# Patient Record
Sex: FEMALE | Race: WHITE
[De-identification: names, ages, dates, MRNs, and addresses within clinical notes are randomized per-mention and may not be internally consistent; named-entity substitution may affect disease eponyms.]

---

## 2017-10-31 ENCOUNTER — HOSPITAL ENCOUNTER (INPATIENT)
Dept: HOSPITAL 92 - SURG A | Age: 82
LOS: 3 days | Discharge: NURSING, REHAB OR LONG-TERM CARE | DRG: 460 | End: 2017-11-03
Attending: NEUROLOGICAL SURGERY | Admitting: NEUROLOGICAL SURGERY
Payer: MEDICARE

## 2017-10-31 VITALS — BODY MASS INDEX: 20.3 KG/M2

## 2017-10-31 DIAGNOSIS — M43.16: Primary | ICD-10-CM

## 2017-10-31 DIAGNOSIS — Z88.8: ICD-10-CM

## 2017-10-31 DIAGNOSIS — M54.16: ICD-10-CM

## 2017-10-31 DIAGNOSIS — G96.0: ICD-10-CM

## 2017-10-31 DIAGNOSIS — M51.27: ICD-10-CM

## 2017-10-31 DIAGNOSIS — M48.061: ICD-10-CM

## 2017-10-31 DIAGNOSIS — R32: ICD-10-CM

## 2017-10-31 DIAGNOSIS — M16.0: ICD-10-CM

## 2017-10-31 PROCEDURE — 01NB0ZZ RELEASE LUMBAR NERVE, OPEN APPROACH: ICD-10-PCS | Performed by: NEUROLOGICAL SURGERY

## 2017-10-31 PROCEDURE — 76001: CPT

## 2017-10-31 PROCEDURE — 00QT0ZZ REPAIR SPINAL MENINGES, OPEN APPROACH: ICD-10-PCS | Performed by: NEUROLOGICAL SURGERY

## 2017-10-31 PROCEDURE — A4216 STERILE WATER/SALINE, 10 ML: HCPCS

## 2017-10-31 PROCEDURE — 0SG1071 FUSION OF 2 OR MORE LUMBAR VERTEBRAL JOINTS WITH AUTOLOGOUS TISSUE SUBSTITUTE, POSTERIOR APPROACH, POSTERIOR COLUMN, OPEN APPROACH: ICD-10-PCS | Performed by: NEUROLOGICAL SURGERY

## 2017-10-31 RX ADMIN — Medication SCH GM: at 18:08

## 2017-10-31 NOTE — XMS
Clinical Summary

 Created on:2017



Patient:Leighann Hong

Sex:Female

:1929

External Reference #:[id number]





Demographics







 Address  [address] [place] [id number]

 

 Home Phone  [phone]

 

 Preferred Language  Unknown

 

 Marital Status  

 

 Voodoo Affiliation  Unknown

 

 Race  White

 

 Ethnic Group  Not  or 









Author







 Organization  [facility]

 

 Address  [address] [place] [address]

 

 Phone  [phone]









Care Team Providers







 Name  Role  Phone

 

 ,  Primary Care Provider  Unavailable









Allergies

Not on File



Current Medications

Not on file



Active Problems

Not on file



Social History







 Tobacco Use  Types  Packs/Day  Years Used  Date

 

 Never Assessed        









 Sex Assigned at Birth  Date Recorded

 

 Not on file  







Last Filed Vital Signs

Not on file



Plan of Treatment

Not on file



Results

Not on filefrom Last 3 Months

## 2017-11-01 RX ADMIN — Medication SCH GM: at 02:49

## 2017-11-01 RX ADMIN — ACETAMINOPHEN AND CODEINE PHOSPHATE PRN TAB: 300; 30 TABLET ORAL at 16:39

## 2017-11-01 RX ADMIN — THERA TABS SCH TAB: TAB at 08:03

## 2017-11-01 RX ADMIN — ACETAMINOPHEN AND CODEINE PHOSPHATE PRN TAB: 300; 30 TABLET ORAL at 13:07

## 2017-11-01 NOTE — PRG
DATE OF SERVICE:  11/01/2017

 

Ms. Hong is postoperative day 1 from L3-L5 decompressive laminectomy with L3-L4 in situ fusion f
or spondylolisthesis.  She is doing well this morning.  She does have, as expected, incisional pain 
and various aspects of neuropathic pain in her lower extremities.  She had significant stenosis and 
we did have an intraoperative CSF leak that was repaired.  She has no symptoms of CSF hypotension to
day and moves extremities well.  We will plan to mobilize her.  She has longstanding urinary inconti
nence even preoperatively she tells me and she is concerned in that she does not want to have the Fo
rosario catheter removed at this time.  We will consult Urology in regards to both short and long-term p
lans that they deem best.  I anticipate patient will be going to rehab at some point.

## 2017-11-01 NOTE — OP
DATE OF PROCEDURE:  10/31/2017  

 

OR:  OR #11.

 

WOUND TYPE:  Type 1 wound.

 

SURGEON:  Juarez Hart M.D.

 

ASSISTANT:  Gene Rueda PA-C.

 

PREPROCEDURE DIAGNOSES:  L3-L4 spondylolisthesis with L3-L5 severe stenosis with low back and leg pa
in.

 

POSTPROCEDURE DIAGNOSES:  L3-L4 spondylolisthesis with L3-L5 severe stenosis with low back and leg p
ain.

 

PROCEDURE:

1.  L3-L4, L4-L5 laminectomies, partial facetectomies, foraminotomies L3, L4, L5 nerve roots.

2.  L3-L4 in situ posterolateral fusion with local bone autograft obtained from same incision and al
lograft.

 

DESCRIPTION OF PROCEDURE:  After informed consent was obtained from the patient, the patient brought
 to OR 11.  Proper patient pause and identification was carried out.  She was placed under excellent
 general endotracheal anesthesia and positioned prone on the operating table.  All appropriate point
s were padded.  We identified the L3, L4, L5 dorsal spines and this area was sterilely cleansed, pre
pared, and draped.  Proper patient pause and identification was carried out.  A rian had been made o
hue the L3-L5 segments.  Again, this was sterilely cleansed, prepared, and draped.  After proper pat
ient pause and identification, the wound was then opened with a combination of sharp, monopolar and 
blunt dissection, the L3, L4, L5 dorsal spines and lamina were exposed.  There was significant spond
ylosis as expected and we then with localization film performed an L3, L4, L5 laminectomy over the L
3-L4 segment.  The dura was quite attenuated given the age of the patient and the longstanding compr
ession.  We came upon a CSF leak.  This was repaired with no further leak.  Copious irrigation occur
red throughout.  We had excellent decompression of the L3, L4, and L5 nerve roots and restoration of
 CSF flow as the patient had severe stenosis again in particular at the L3-L4 segment, likely no mayra
w or very little CSF flow through that segment.  We were pleased with our decompression.  Hemostasis
 was maximized throughout.  The wound was then closed in anatomic layers following the sprinkling of
 vancomycin powder and a small amount of dural sealant over the dural tube.

## 2017-11-02 RX ADMIN — THERA TABS SCH TAB: TAB at 09:10

## 2017-11-02 RX ADMIN — ACETAMINOPHEN AND CODEINE PHOSPHATE PRN TAB: 300; 30 TABLET ORAL at 05:49

## 2017-11-02 RX ADMIN — ACETAMINOPHEN AND CODEINE PHOSPHATE PRN TAB: 300; 30 TABLET ORAL at 21:46

## 2017-11-02 RX ADMIN — ACETAMINOPHEN AND CODEINE PHOSPHATE PRN TAB: 300; 30 TABLET ORAL at 11:49

## 2017-11-02 NOTE — PRG
DATE OF SERVICE:  11/02/2017

 

SUBJECTIVE:  Ms. Hong is postoperative day 2 from a multilevel lumbar decompression and in situ 
fusion.  Ms. Hong is doing very well this morning.  She has had significant improvement in her p
ain in her low back and legs and will begin to mobilize more with therapy today.  I think she would 
benefit from inpatient rehabilitation.

 

PHYSICAL EXAMINATION:  Wound is dry as is her dressing.  She is concerned about her longstanding 10-
year urinary incontinence and the spread of urine into her wound, but I think it is prudent to remov
e her Rodriguez catheter.  Our Urology colleagues stated that they would see her as an outpatient.  As s
[facility], we will make arrangements to remove her Rodriguez catheter and arrange for diapers for her to wear 
and attempt to do dressing that prevents soilage of the wound.  I would be fine with transfer to irvin
ab at any point beginning today.

## 2017-11-03 VITALS — SYSTOLIC BLOOD PRESSURE: 159 MMHG | DIASTOLIC BLOOD PRESSURE: 78 MMHG

## 2017-11-03 VITALS — TEMPERATURE: 98 F

## 2017-11-03 RX ADMIN — THERA TABS SCH TAB: TAB at 08:45

## 2017-11-03 RX ADMIN — ACETAMINOPHEN AND CODEINE PHOSPHATE PRN TAB: 300; 30 TABLET ORAL at 09:58

## 2017-11-03 NOTE — PRG
DATE OF SERVICE:  11/03/2017

 

Ms. Hong is now hospital day 3 for multilevel lumbar decompression and insitu fusion. She is doi
ng well with some low back and leg pain, but perhaps she may have overdone it a bit.  Yesterday, she
 has been approved for rehabilitation.  Her wound is dry with scant drainage on her dressing.  She h
as had no symptoms of CSF hypotension and has good strength in her lower extremities.  I suspect she
 simply needs time.  We will arrange for her to be transferred to inpatient rehabilitation.  She has
 longstanding urinary incontinence and we have arranged for dressings to protect her lumbar wound.  
We will arrange appropriate followup.

## 2017-11-06 NOTE — DIS
DATE OF ADMISSION:  10/31/2017

 

DATE OF DISCHARGE:  11/03/2017

 

DISCHARGE DIAGNOSES:  

1.  Low back pain with bilateral lower extremity pain.  

2.  Lumbar spondylolisthesis at L3-4.  

3.  Lumbar spinal stenosis.  

4.  Chronic urinary incontinence. 

 

HOSPITAL COURSE:  Ms. Hong was admitted on 10/31/2017 to undergo L3-L5 laminectomies and L3-4 in
 situ posterior lateral fusion with Dr. Hart.  The patient tolerated the procedure well; however, 
due to the severity of the patient's spinal stenosis and longstanding compression, we did come upon 
a CSF leak that was repaired with Surgicel and no evidence of CSF leak postoperatively.  The patient
 was sent to recover on the surgical floor for 2 overnight stays.  Rodriguez catheter was placed for the
 patient and Urology was consulted; however, due to the chronicity of the patient's urinary incontin
ence, they opted to follow up with the patient on an outpatient basis.  The patient had no postural 
headache postoperatively.  She did experience some radicular symptoms postoperatively, likely relate
d to radiculitis and was placed on a Medrol Dosepak postoperatively which significantly helped her s
ymptoms.  She did work with Physical Therapy and Occupational Therapy, was later discharged on the m
orning of 11/03/2017 to inpatient rehab.  The patient's son was updated in great detail on the morni
ng of Friday, November 3 as well.  At the time of discharge the patient was improving in regard to h
er back symptoms, but did have some incisional back pain, but was pleased with her progress postoper
atively.  Again, outpatient Urology appointment was arranged and the patient was stable at discharge
.  Appropriate patient education and outpatient follow up appointments were scheduled, with the unde
rstanding that the patient may call the office at any time with questions or concerns regarding her 
care.  Again, at the time of discharge the patient and her son were very pleased with her outcome po
stoperatively with the understanding that she does need continued time to heal.

## 2017-12-11 ENCOUNTER — HOSPITAL ENCOUNTER (OUTPATIENT)
Dept: HOSPITAL 92 - BICMRI | Age: 82
Discharge: HOME | End: 2017-12-11
Attending: FAMILY MEDICINE
Payer: MEDICARE

## 2017-12-11 DIAGNOSIS — M99.73: ICD-10-CM

## 2017-12-11 DIAGNOSIS — M43.16: Primary | ICD-10-CM

## 2017-12-11 DIAGNOSIS — M48.061: ICD-10-CM

## 2017-12-11 DIAGNOSIS — M25.78: ICD-10-CM

## 2017-12-11 DIAGNOSIS — Z98.890: ICD-10-CM

## 2017-12-11 PROCEDURE — 72148 MRI LUMBAR SPINE W/O DYE: CPT

## 2018-01-05 ENCOUNTER — HOSPITAL ENCOUNTER (INPATIENT)
Dept: HOSPITAL 92 - ERS | Age: 83
LOS: 4 days | Discharge: NURSING, REHAB OR LONG-TERM CARE | DRG: 274 | End: 2018-01-09
Attending: FAMILY MEDICINE | Admitting: FAMILY MEDICINE
Payer: MEDICARE

## 2018-01-05 VITALS — BODY MASS INDEX: 20.6 KG/M2

## 2018-01-05 DIAGNOSIS — M48.00: ICD-10-CM

## 2018-01-05 DIAGNOSIS — B37.49: ICD-10-CM

## 2018-01-05 DIAGNOSIS — R32: ICD-10-CM

## 2018-01-05 DIAGNOSIS — E87.1: ICD-10-CM

## 2018-01-05 DIAGNOSIS — I10: ICD-10-CM

## 2018-01-05 DIAGNOSIS — I95.9: ICD-10-CM

## 2018-01-05 DIAGNOSIS — Z88.8: ICD-10-CM

## 2018-01-05 DIAGNOSIS — R00.0: ICD-10-CM

## 2018-01-05 DIAGNOSIS — B96.89: ICD-10-CM

## 2018-01-05 DIAGNOSIS — Z88.5: ICD-10-CM

## 2018-01-05 DIAGNOSIS — R00.1: ICD-10-CM

## 2018-01-05 DIAGNOSIS — R55: Primary | ICD-10-CM

## 2018-01-05 DIAGNOSIS — I44.7: ICD-10-CM

## 2018-01-05 DIAGNOSIS — D64.9: ICD-10-CM

## 2018-01-05 DIAGNOSIS — F03.90: ICD-10-CM

## 2018-01-05 DIAGNOSIS — E53.8: ICD-10-CM

## 2018-01-05 DIAGNOSIS — N39.0: ICD-10-CM

## 2018-01-05 LAB
ALBUMIN SERPL BCG-MCNC: 3.4 G/DL (ref 3.4–4.8)
ALP SERPL-CCNC: 48 U/L (ref 40–150)
ALT SERPL W P-5'-P-CCNC: 11 U/L (ref 8–55)
ANION GAP SERPL CALC-SCNC: 13 MMOL/L (ref 10–20)
AST SERPL-CCNC: 15 U/L (ref 5–34)
BACTERIA UR QL AUTO: (no result) HPF
BASOPHILS # BLD AUTO: 0 THOU/UL (ref 0–0.2)
BASOPHILS NFR BLD AUTO: 0.2 % (ref 0–1)
BILIRUB SERPL-MCNC: 0.5 MG/DL (ref 0.2–1.2)
BUN SERPL-MCNC: 11 MG/DL (ref 9.8–20.1)
CALCIUM SERPL-MCNC: 9.2 MG/DL (ref 7.8–10.44)
CHLORIDE SERPL-SCNC: 101 MMOL/L (ref 98–107)
CK MB SERPL-MCNC: 1.4 NG/ML (ref 0–6.6)
CK SERPL-CCNC: 34 U/L (ref 29–168)
CO2 SERPL-SCNC: 22 MMOL/L (ref 23–31)
CREAT CL PREDICTED SERPL C-G-VRATE: 0 ML/MIN (ref 70–130)
CRYSTAL-AUWI FLAG: 0 (ref 0–15)
EOSINOPHIL # BLD AUTO: 0.2 THOU/UL (ref 0–0.7)
EOSINOPHIL NFR BLD AUTO: 1.8 % (ref 0–10)
GLOBULIN SER CALC-MCNC: 2.7 G/DL (ref 2.4–3.5)
GLUCOSE SERPL-MCNC: 115 MG/DL (ref 83–110)
HEV IGM SER QL: 0 (ref 0–7.99)
HGB BLD-MCNC: 10.2 G/DL (ref 12–16)
HYALINE CASTS #/AREA URNS LPF: (no result) LPF
LYMPHOCYTES # BLD: 1 THOU/UL (ref 1.2–3.4)
LYMPHOCYTES NFR BLD AUTO: 11.2 % (ref 21–51)
MAGNESIUM SERPL-MCNC: 1.9 MG/DL (ref 1.6–2.6)
MCH RBC QN AUTO: 35 PG (ref 27–31)
MCV RBC AUTO: 102 FL (ref 81–99)
MONOCYTES # BLD AUTO: 0.8 THOU/UL (ref 0.11–0.59)
MONOCYTES NFR BLD AUTO: 8.9 % (ref 0–10)
NEUTROPHILS # BLD AUTO: 7.2 THOU/UL (ref 1.4–6.5)
NEUTROPHILS NFR BLD AUTO: 77.9 % (ref 42–75)
PATHC CAST-AUWI FLAG: 0 (ref 0–2.49)
PLATELET # BLD AUTO: 324 THOU/UL (ref 130–400)
POTASSIUM SERPL-SCNC: 3.6 MMOL/L (ref 3.5–5.1)
PROT UR STRIP.AUTO-MCNC: 100 MG/DL
RBC # BLD AUTO: 2.91 MILL/UL (ref 4.2–5.4)
RBC UR QL AUTO: (no result) HPF (ref 0–3)
SODIUM SERPL-SCNC: 132 MMOL/L (ref 136–145)
SP GR UR STRIP: 1.02 (ref 1–1.04)
SPERM-AUWI FLAG: 0 (ref 0–9.9)
TROPONIN I SERPL DL<=0.01 NG/ML-MCNC: (no result) NG/ML (ref ?–0.03)
WBC # BLD AUTO: 9.2 THOU/UL (ref 4.8–10.8)
YEAST FLD HPF-#/AREA: (no result) HPF
YEAST-AUWI FLAG: 1570 (ref 0–25)

## 2018-01-05 PROCEDURE — 33282: CPT

## 2018-01-05 PROCEDURE — 93306 TTE W/DOPPLER COMPLETE: CPT

## 2018-01-05 PROCEDURE — 81003 URINALYSIS AUTO W/O SCOPE: CPT

## 2018-01-05 PROCEDURE — 83735 ASSAY OF MAGNESIUM: CPT

## 2018-01-05 PROCEDURE — 84443 ASSAY THYROID STIM HORMONE: CPT

## 2018-01-05 PROCEDURE — 93010 ELECTROCARDIOGRAM REPORT: CPT

## 2018-01-05 PROCEDURE — 85025 COMPLETE CBC W/AUTO DIFF WBC: CPT

## 2018-01-05 PROCEDURE — 93880 EXTRACRANIAL BILAT STUDY: CPT

## 2018-01-05 PROCEDURE — 87086 URINE CULTURE/COLONY COUNT: CPT

## 2018-01-05 PROCEDURE — 80053 COMPREHEN METABOLIC PANEL: CPT

## 2018-01-05 PROCEDURE — C1730 CATH, EP, 19 OR FEW ELECT: HCPCS

## 2018-01-05 PROCEDURE — 81015 MICROSCOPIC EXAM OF URINE: CPT

## 2018-01-05 PROCEDURE — 82550 ASSAY OF CK (CPK): CPT

## 2018-01-05 PROCEDURE — C1769 GUIDE WIRE: HCPCS

## 2018-01-05 PROCEDURE — 93621 COMP EP EVL L PAC&REC C SINS: CPT

## 2018-01-05 PROCEDURE — 71045 X-RAY EXAM CHEST 1 VIEW: CPT

## 2018-01-05 PROCEDURE — 36415 COLL VENOUS BLD VENIPUNCTURE: CPT

## 2018-01-05 PROCEDURE — 84484 ASSAY OF TROPONIN QUANT: CPT

## 2018-01-05 PROCEDURE — 80048 BASIC METABOLIC PNL TOTAL CA: CPT

## 2018-01-05 PROCEDURE — 93005 ELECTROCARDIOGRAM TRACING: CPT

## 2018-01-05 PROCEDURE — 82553 CREATINE MB FRACTION: CPT

## 2018-01-05 PROCEDURE — C1764 EVENT RECORDER, CARDIAC: HCPCS

## 2018-01-05 RX ADMIN — SULFAMETHOXAZOLE AND TRIMETHOPRIM SCH TAB: 800; 160 TABLET ORAL at 21:13

## 2018-01-05 NOTE — RAD
PORTABLE CHEST ONE VIEW:

 

Date: 1-5-18 

Time: 9:02 a.m.

 

History: Syncope. 

 

FINDINGS: 

The heart is enlarged. The aorta is tortuous. The lungs are well expanded without focal areas of cons
olidation, pneumothorax, rafael pulmonary edema, or pleural effusions. 

 

IMPRESSION: 

Cardiomegaly. 

 

POS: OFF

## 2018-01-05 NOTE — HP-2
CODE STATUS:  FULL.

 

PRIMARY CARE PHYSICIAN:  Ayla Wood M.D.

 

ATTENDING:   Ayla Wood M.D.

 

RESIDENT:  Harish Clements DO

 

HISTORIAN:  The patient.

 

CHIEF COMPLAINT:  Syncope.

 

HISTORY OF PRESENT ILLNESS:  This morning in nursing home, the patient became "very tired" and then h
ad a syncopal episode which was witnessed by nursing facility staff.  Immediately thereafter, the pat
ient was noted to be significantly tachycardic and hypotensive and EMS was called when at the time of
 EMS arrival, the patient's heart rate was in the 40s.  She was hypertensive.  Upon arrival at the em
ergency room, her heart rate varied from the 50s to the 110s and blood pressure was stable throughout
.  She has never had similar episodes in the past.  She has no prior heart history.  In the emergency
 room, she received 1.5 liters of fluid.

 

PAST MEDICAL HISTORY:  Includes spinal stenosis, urinary incontinence, chronic B12 deficient anemia, 
and dementia.

 

PAST SURGICAL HISTORY:  Includes L3 through L5 laminectomy, hysterectomy, and a bladder lift.

 

ALLERGIES:  GABAPENTIN, NORCO, and PROCAINE.

 

MEDICATIONS:  Amlodipine 5 mg b.i.d., tramadol 50 mg q.6 hours p.r.n. pain, vitamin cholecalciferol 1
000 units daily, donepezil 10 mg daily, MiraLax 17 grams daily, multivitamin daily, Premarin 0.625 mg
 every other day, oxybutynin chloride 2.5 mg t.i.d., Lyrica 75 mg daily, tizanidine 4 mg q.8 hours p.
r.n.

 

SOCIAL HISTORY:  Tobacco, none.  ETOH, none.  Drugs, none.

 

REVIEW OF SYSTEMS:  General:  Denies fever, chills, change in appetite or sleep.  HEENT:  Denies any 
vision changes or _____ changes.  Denies any nasal congestion.  Respiratory:  Denies cough, congestio
n, or shortness of breath.  Cardiovascular:  Denies chest pain.  Admits to palpitations.  GI:  Denies
 any nausea or vomiting.  Genitourinary:  Admits to chronic incontinence without dysuria.  Skin:  Den
ies rashes or lesions.  Musculoskeletal:  Denies any pain or tenderness.  Neurologic:  Admits to weak
ness and syncope.  Denies numbness or seizure.  Psychiatric:  Denies anxiety or depression.

 

PHYSICAL EXAMINATION:

VITAL SIGNS:  Blood pressure 125/67.  Pulse 69, this varies between .  Respiratory rate 19, T-m
ax 97.5, pulse ox 99% on room air, current weight 51.7 kg.

GENERAL:  The patient is alert and oriented x3, in no apparent distress.

HEENT:  PERRLA, EOMI.  Conjunctivae within normal limits.

NECK:  Supple.  No lymphadenopathy.

CARDIOVASCULAR:  Regular rate and rhythm without murmur.

RESPIRATORY:  Normal effort.  Clear to auscultation bilaterally without retractions.

SKIN:  Warm and dry.

ABDOMEN:  Soft, nontender.  Bowel sounds in all 4 quadrants without masses or distention.

EXTREMITIES:  No clubbing or cyanosis.

MUSCULOSKELETAL:  Tone is within normal limits.

NEUROLOGICAL:  No focal neurological deficit.  Cranial nerves II through XII grossly intact.

 

LABORATORY DATA:  CBC:  Hemoglobin 10.2, hematocrit 29.8, white count 9.2, platelets 324, , ne
utrophils 77.9%.  CMP:  Sodium 132, potassium 3.6, chloride 101, bicarbonate 22, BUN 11, creatinine 0
.76, glucose is 115, calcium is 9.2, total serum protein 6.1, albumin is 3.1, total bilirubin 0.5, AS
T 15, ALT 11, alkaline phosphatase 48, GFR 72.  CK is 34, CK-MB is 1.4.  Troponins are negative.  TSH
 is 3.1.  UA; specific gravity 1.016, blood moderate, protein 100, leukocyte esterase large, nitrites
 negative, ketones negative, glucose negative, rbc's 21-50, wbc's too numerous to count, bacteria 4+,
 yeast 1+.  EKG:  Normal sinus rhythm with a left bundle branch block.  Chest x-ray shows cardiomegal
y.

 

ASSESSMENT AND PLAN:

1.  This is an 88-year-old female with recent history of syncope and bradycardia with tachycardia.

2.  Syncope.  Admit to tele obs.  Monitor for any arrhythmia.  Consult Cardiology for possible sick s
inus syndrome.

3.  Urinary tract infection, both Candida and bacterial.  Urine cultures.  Start Diflucan and Bactrim
.

4.  Status post laminectomy, L3 through L5.  The patient is currently in skilled nursing facility for
 PT and rehabilitation.  We will consult PT here.

5.  Chronic macrocytic anemia.  Continue B12 and folate.

6.  Chronic incontinence.  Continue her home medications.

7.  Hyponatremia, currently asymptomatic.  We will continue IV fluids and recheck BMP in the morning.


8.  Deep venous thrombosis prophylaxis.  We will apply SCDs.  Symptomatic medications will be provide
d.

 

History, physical exam, as well as management were discussed with Dr. Wood.

## 2018-01-06 LAB
ANION GAP SERPL CALC-SCNC: 10 MMOL/L (ref 10–20)
BUN SERPL-MCNC: 11 MG/DL (ref 9.8–20.1)
CALCIUM SERPL-MCNC: 8.9 MG/DL (ref 7.8–10.44)
CHLORIDE SERPL-SCNC: 104 MMOL/L (ref 98–107)
CO2 SERPL-SCNC: 23 MMOL/L (ref 23–31)
CREAT CL PREDICTED SERPL C-G-VRATE: 51 ML/MIN (ref 70–130)
GLUCOSE SERPL-MCNC: 93 MG/DL (ref 83–110)
POTASSIUM SERPL-SCNC: 4.1 MMOL/L (ref 3.5–5.1)
SODIUM SERPL-SCNC: 133 MMOL/L (ref 136–145)

## 2018-01-06 RX ADMIN — SULFAMETHOXAZOLE AND TRIMETHOPRIM SCH TAB: 800; 160 TABLET ORAL at 08:39

## 2018-01-06 RX ADMIN — SULFAMETHOXAZOLE AND TRIMETHOPRIM SCH TAB: 800; 160 TABLET ORAL at 20:55

## 2018-01-06 RX ADMIN — MULTIPLE VITAMINS W/ MINERALS TAB SCH TAB: TAB at 08:39

## 2018-01-06 NOTE — ULT
CAROTID ULTRASOUND WITH GRAY SCALE AND DOPPLER DUPLEX COLOR FLOW IMAGING

SPECTRAL ANALYSIS PERFORMED:

 

CLINICAL INDICATION: 

Syncope.

 

FINDINGS: 

There is mild plaque/intimal thickening and atherosclerotic calcification of the carotid arteries.

 

PEAK SYSTOLIC VELOCITY (CM/S):

Right CCA         89

Left CCA         102

Right ICA         69

Left ICA          83

 

There is antegrade flow within the visualized bilateral vertebral arteries.

 

IMPRESSION: 

1.  No hemodynamically significant stenosis of the right internal carotid artery.

2.  No hemodynamically significant stenosis of the left internal carotid artery.

 

POS: PAPO

## 2018-01-06 NOTE — PDOC.FM
- Subjective


Subjective: 





Mrs. Hong is doing well this morning.  States that she is asymptomatic just 

as she has been since the syncopal episode occurred yesterday.  She states that 

she would like to go home today. Denies chest pain, dyspnea, n/v/d.





- Objective


MAR Reviewed: Yes


Vital Signs & Weight: 


 Vital Signs (12 hours)











  Temp Pulse Resp BP Pulse Ox


 


 01/06/18 04:20  97.5 F L  76  18  154/67 H  97


 


 01/06/18 00:21  98.5 F  78  16  133/64  97


 


 01/05/18 21:13  98.2 F  96  18  


 


 01/05/18 19:33  98.2 F  96  18  173/75 H  95


 


 01/05/18 18:11  98.1 F  86  18  179/80 H  99








 Weight











Weight                         52.844 kg














I&O: 


 











 01/04/18 01/05/18 01/06/18





 06:59 06:59 06:59


 


Intake Total   720


 


Balance   720











Result Diagrams: 


 01/05/18 08:54





 01/06/18 04:58





<Yovani Martinez - Last Filed: 01/06/18 10:56>





- Objective


Vital Signs & Weight: 


 Vital Signs (12 hours)











  Temp Pulse Resp BP BP BP BP


 


 01/06/18 15:39  97.5 F L  92  16   138/79  


 


 01/06/18 11:38  97.8 F  78  18  180/74 H   


 


 01/06/18 08:30  97.5 F L  76  18    


 


 01/06/18 08:22  98.1 F  77  22 H   175/91 H  185/79 H  175/77 H


 


 01/06/18 04:20  97.5 F L  76  18  154/67 H   














  Pulse Ox


 


 01/06/18 15:39  97


 


 01/06/18 11:38  99


 


 01/06/18 08:30 


 


 01/06/18 08:22  98


 


 01/06/18 04:20  97








 Weight











Weight                         52.844 kg














I&O: 


 











 01/05/18 01/06/18 01/07/18





 06:59 06:59 06:59


 


Intake Total  720 


 


Balance  720 











Result Diagrams: 


 01/05/18 08:54





 01/06/18 04:58





<Ayla Wood - Last Filed: 01/06/18 16:13>





Phys Exam





- Physical Examination


Constitutional: NAD


HEENT: moist MMs, sclera anicteric


Neck: no JVD, supple, full ROM


Respiratory: no wheezing, no rales, no rhonchi, clear to auscultation bilateral


Cardiovascular: RRR, no significant murmur, no rub


Gastrointestinal: soft, non-tender, no distention


Musculoskeletal: pulses present, edema present


1+ LE pitting edema b/l


Neurological: non-focal, normal sensation, moves all 4 limbs


Psychiatric: normal affect, A&O x 3





<Yovani Martinez - Last Filed: 01/06/18 10:56>





Dx/Plan


(1) Symptomatic bradycardia


Code(s): R00.1 - BRADYCARDIA, UNSPECIFIED   Status: Acute   


Plan: 


Considering Sick Sinus Syndrome


-Dr. Wiseman with Cardiology has been consulted, appreciate recs.


-continue monitoring vitals and rhythm








(2) UTI (urinary tract infection)


Status: Acute   


Plan: 


Start Bactrim and one time PO diflucan








(3) History of laminectomy


Code(s): Z98.890 - OTHER SPECIFIED POSTPROCEDURAL STATES   Status: Chronic   


Plan: 


pt currently in SNF for PT and rehab, will continue PT here








(4) Macrocytic anemia


Code(s): D53.9 - NUTRITIONAL ANEMIA, UNSPECIFIED   Status: Chronic   


Plan: 


Continue home meds 








(5) Hyponatremia


Code(s): E87.1 - HYPO-OSMOLALITY AND HYPONATREMIA   Status: Acute   


Plan: 


Currently asymptomatic, 132 on admission


Will continue to monitor, tolerating normal diet and PO fluid intake








<Yovani Martinez - Last Filed: 01/06/18 10:56>





Attending Addendum





- Attending Addendum


I personally evaluated the patient and discussed the management with Dr. Martinez.


I agree with the History, Examination, Assessment and Plan documented above 

with any addition or exceptions noted below.


The patient has remained stable.  The patient's heart rate is stable.  Getting 

carotid doppler.  Cardiology has been consulted.  Appreciate their 

recommendations.





<Ayla Wood - Last Filed: 01/06/18 16:13>

## 2018-01-06 NOTE — HP
DATE OF SERVICE:  01/05/2018

 

CHIEF COMPLIANT:  Syncope and hypertension.

 

HISTORY OF PRESENT ILLNESS:  The patient is an 88-year-old  female 
with a past medical history of spinal stenosis, status post back surgery, 
chronic B12 deficiency anemia who is well known to me and was sent to the ER 
from [facility].  The patient was difficult to arouse 
at the dining room while she was eating breakfast.  The patient's vitals were 
taken and her systolic blood pressure was in the 80s and was initially 
tachycardic.  Patient was able to wake up for a minute and said "I woke up at 5:
00 a.m." and then passed out again.  EMS was called.  When the EMS arrived, the 
patient was noted to be bradycardic at that time, and was brought to the ER.  
In the ER, she received a liter and a half of fluid though she was hypertensive 
and bradycardic.  All troponins have been negative.  Patient was mildly 
hyponatremic with a sodium of 132 and mildly anemic with hemoglobin of 10.2 and 
an MCV of 102.  UA was consistent with a urinary tract infection.

 

Past medical history and review of systems, please see the resident's dictation.

 

PHYSICAL EXAMINATION:

VITAL SIGNS:  Temperature 98.1, pulse 86, respiration rate 18, O2 sat 99% on 
room air, blood pressure 179/80.

GENERAL:  The patient is awake, alert, and oriented, in no acute distress.

CARDIOVASCULAR:  Regular rate and rhythm without murmurs, gallops, or rubs.

LUNGS:  Clear to auscultation bilaterally without wheezing or rhonchi.

ABDOMEN:  Soft, nontender, nondistended, bowel sounds present.

EXTREMITIES:  No clubbing, cyanosis, or edema.

 

ASSESSMENT AND PLAN:

1.  Syncope.  Patient will be placed on telemetry observation.  There is some 
concern that she may have sick sinus syndrome worse, but she is converted from 
tachycardia-bradycardia back again.  Cardiology has been consulted.

2.  Urinary tract infection.  Patient is being started on antibiotics, put on 
Bactrim, and Diflucan for a candidal infection.

3.  Status post laminectomy.  Patient will continue on her home medications, 
which include Lyrica and tizanidine for muscle cramps that happened at night 
following her back surgery.

4.  Chronic microcytic anemia.  Continue B12 and folate.

5.  History and physical, assessment and plan, please see the resident's 
dictation.

 

I have discussed the case with Dr. Clements and agree with his documentation for 
repeated pertinent portions of the history and physical by myself.

 

NOAM

## 2018-01-06 NOTE — CON
DATE OF CONSULT: 

 

HISTORY OF PRESENT ILLNESS:  

Patient is a pleasant 68-year-old woman who presents for evaluation of a rapid heart rate.  The patie
nt has a history of atrial fibrillation.  She was seen in December and found to have atrial fibrillat
ion with rapid ventricular response.  She was started on Cardizem and Eliquis to control her heart ra
te.  She has follow up at [facility] with her primary physician.  She states she was feeling wel
l until a few days ago when she started having dyspnea.  She denied having any chest discomfort.  The
 patient denies having any palpitations.  She was admitted with presumed pneumonia.  She was started 
on antibiotics.  The patient was noted to have a rapid heart rate despite being on Cardizem.  The pat
ient denies having any palpitation.  She denies having any chest discomfort.

 

PAST MEDICAL HISTORY:

1.  Atrial fibrillation.

2.  Hypertension.

3.  Hypercholesterolemia.

 

PAST SURGICAL HISTORY: 

 She has had a tubal ligation and a .

 

SOCIAL HISTORY:  

She is a nonsmoker.

 

MEDICATIONS ON ADMISSION:  

Include Apixaban 5 b.i.d., Flexeril 10 t.i.d., Cardizem 240 daily, Pepcid 20 daily.

 

FAMILY HISTORY:  

Positive family history of heart disease.

 

ALLERGIES:  

No known drug allergies.

 

REVIEW OF SYSTEMS:  

Ten-point system otherwise unremarkable.  No history of bright red blood per rectum, hematuria.  Ten-
point system otherwise unremarkable.

 

PHYSICAL EXAMINATION:

GENERAL: Obese woman in no distress.

VITAL SIGNS:  Blood pressure is 128/87, heart rate is approximately 120 and irregular.

NECK:  No jugular venous distention.

LUNGS:  Coarse breath sounds bilateral.

HEART:  Regular rate and rhythm, normal S1, S2, no murmurs.

ABDOMEN:  Distended.

EXTREMITIES:  Showed trace edema.

SKIN:  Warm and dry.

NEUROLOGIC:  Nonfocal.

VASCULAR:  Radial pulses are 2+.

 

LABORATORY:  

Sodium 140, potassium 4.4, chloride 109, bicarbonate 23, BUN 17, creatinine 0.8, glucose was 284.  Wh
ite blood cell count 9.8, hemoglobin 13.1, hematocrit 41, and platelets were 186.  Her EKG was atrial
 fibrillation with a nonspecific ST abnormality.

 

IMPRESSION:

1.  Atrial fibrillation with a rapid ventricular response.

2.  Presumed pneumonia.

3.  Hypertension.

4.  Morbid obesity.

 

This patient presents with pneumonia, being treated influenza.  The patient is asymptomatic, but has 
a very rapid heart rate despite being on Cardizem with no history of COPD or asthma would recommend s
he start beta-blocker therapy.  We will start Toprol 50 XL daily, her heart rate continues to be elev
ated.  I would consider electrical cardioversion. The patient has LINCOLN-VAS score of 3 and should sage
in on Apixaban. I explained the life threatening consequences to the patient from not being compliant
 with this medication.  We will follow this patient with you through her hospitalization.

## 2018-01-07 LAB
ANION GAP SERPL CALC-SCNC: 10 MMOL/L (ref 10–20)
BUN SERPL-MCNC: 13 MG/DL (ref 9.8–20.1)
CALCIUM SERPL-MCNC: 9.1 MG/DL (ref 7.8–10.44)
CHLORIDE SERPL-SCNC: 105 MMOL/L (ref 98–107)
CO2 SERPL-SCNC: 24 MMOL/L (ref 23–31)
CREAT CL PREDICTED SERPL C-G-VRATE: 37 ML/MIN (ref 70–130)
GLUCOSE SERPL-MCNC: 95 MG/DL (ref 83–110)
POTASSIUM SERPL-SCNC: 4.5 MMOL/L (ref 3.5–5.1)
SODIUM SERPL-SCNC: 134 MMOL/L (ref 136–145)

## 2018-01-07 RX ADMIN — SULFAMETHOXAZOLE AND TRIMETHOPRIM SCH TAB: 800; 160 TABLET ORAL at 20:05

## 2018-01-07 RX ADMIN — MULTIPLE VITAMINS W/ MINERALS TAB SCH TAB: TAB at 08:41

## 2018-01-07 RX ADMIN — SULFAMETHOXAZOLE AND TRIMETHOPRIM SCH TAB: 800; 160 TABLET ORAL at 08:42

## 2018-01-07 NOTE — PDOC.FM
- Subjective


Subjective: 





Mrs. Hong is doing well this morning.  She has no complaints, there were no 

acute events overnight, she denies chest pain, dyspnea, n/v/d, fever. 





- Objective


MAR Reviewed: Yes


Vital Signs & Weight: 


 Vital Signs (12 hours)











  Temp Pulse Resp BP Pulse Ox


 


 01/06/18 21:02  98.0 F  70  16  


 


 01/06/18 20:55   70   


 


 01/06/18 20:20  98.0 F  70  16  150/63 H  97








 Weight











Weight                         52.844 kg














I&O: 


 











 01/05/18 01/06/18 01/07/18





 06:59 06:59 06:59


 


Intake Total  720 480


 


Output Total   200


 


Balance  720 280











Result Diagrams: 


 01/05/18 08:54





 01/07/18 05:44





<Yovani Martinez - Last Filed: 01/07/18 06:53>





- Objective


Vital Signs & Weight: 


 Vital Signs (12 hours)











  Temp Pulse Resp BP BP Pulse Ox


 


 01/07/18 15:25  97.7 F  68  16   160/71 H  99


 


 01/07/18 11:00  97.4 F L  69  16   155/68 H  99


 


 01/07/18 08:41   65   146/61 H  


 


 01/07/18 08:40  98.5 F  65  16   


 


 01/07/18 07:10  98.5 F  65  16   146/61 H  96


 


 01/07/18 05:46   80  16  167/70 H   96








 Weight











Weight                         49.079 kg














I&O: 


 











 01/06/18 01/07/18 01/08/18





 06:59 06:59 06:59


 


Intake Total 720 480 240


 


Output Total  200 


 


Balance 720 280 240











Result Diagrams: 


 01/05/18 08:54





 01/07/18 05:44





<Ayla Wood - Last Filed: 01/07/18 16:33>





Phys Exam





- Physical Examination


Constitutional: NAD


HEENT: moist MMs, sclera anicteric


Neck: no JVD, supple, full ROM


Respiratory: no wheezing, no rales, no rhonchi, clear to auscultation bilateral


Cardiovascular: RRR, no significant murmur


Gastrointestinal: soft, non-tender


Musculoskeletal: no edema


Neurological: non-focal, normal sensation, moves all 4 limbs


Psychiatric: normal affect, A&O x 3





<Yovani Martinez - Last Filed: 01/07/18 06:53>





Dx/Plan


(1) Symptomatic bradycardia


Code(s): R00.1 - BRADYCARDIA, UNSPECIFIED   Status: Acute   


Plan: 


Considering Sick Sinus Syndrome, currently in NSR and asymptomatic


-Dr. Wiseman with Cardiology has been consulted, appreciate recs.


-Cardiology to consult EP to assess whether she had a Linq device vs a EPS 

study to evaluate her conduction system








(2) UTI (urinary tract infection)


Status: Acute   


Plan: 


Start Bactrim and one time PO diflucan, cx pending








(3) History of laminectomy


Code(s): Z98.890 - OTHER SPECIFIED POSTPROCEDURAL STATES   Status: Chronic   


Plan: 


pt currently in SNF for PT and rehab, will continue PT here








(4) Macrocytic anemia


Code(s): D53.9 - NUTRITIONAL ANEMIA, UNSPECIFIED   Status: Chronic   


Plan: 


Continue home meds 








(5) Hyponatremia


Code(s): E87.1 - HYPO-OSMOLALITY AND HYPONATREMIA   Status: Acute   


Plan: 


Currently asymptomatic, 132 on admission


Will continue to monitor, tolerating normal diet and PO fluid intake


repeat BMP this morning








<Yovani Martinez - Last Filed: 01/07/18 06:53>





Attending Addendum





- Attending Addendum


I personally evaluated the patient and discussed the management with Dr. Martinez.


I agree with the History, Examination, Assessment and Plan documented above 

with any addition or exceptions noted below.


The patient is feeling well.  She states she knows she will be here through 

Monday.  Patient will be seen by EP per cardiology recs.  Hyponatremia is 

improved.  





<Ayla Wood - Last Filed: 01/07/18 16:33>

## 2018-01-08 PROCEDURE — 4A023FZ MEASUREMENT OF CARDIAC RHYTHM, PERCUTANEOUS APPROACH: ICD-10-PCS | Performed by: INTERNAL MEDICINE

## 2018-01-08 PROCEDURE — 4A0234Z MEASUREMENT OF CARDIAC ELECTRICAL ACTIVITY, PERCUTANEOUS APPROACH: ICD-10-PCS | Performed by: INTERNAL MEDICINE

## 2018-01-08 PROCEDURE — 0JH632Z INSERTION OF MONITORING DEVICE INTO CHEST SUBCUTANEOUS TISSUE AND FASCIA, PERCUTANEOUS APPROACH: ICD-10-PCS | Performed by: INTERNAL MEDICINE

## 2018-01-08 RX ADMIN — MULTIPLE VITAMINS W/ MINERALS TAB SCH TAB: TAB at 08:11

## 2018-01-08 RX ADMIN — SULFAMETHOXAZOLE AND TRIMETHOPRIM SCH TAB: 800; 160 TABLET ORAL at 08:11

## 2018-01-08 NOTE — PDOC.FM
- Subjective


Subjective: 





Mrs. Hong is doing well this morning, there were no acute events over 

night.  She only complains of leg spasms, which are chronic and normally 

relieved with tylenol.  She was requesting tylenol this morning.  No other 

issues noted. Denies fever, chest pain, dyspnea, n/v/d.





- Objective


MAR Reviewed: Yes


Vital Signs & Weight: 


 Vital Signs (12 hours)











  Temp Pulse Resp BP BP BP Pulse Ox


 


 01/08/18 02:15   67  16   136/75   96


 


 01/07/18 20:04  98.2 F  69  16  167/73 H   


 


 01/07/18 19:40  98.2 F  69  16    167/73 H  96








 Weight











Weight                         49.351 kg














I&O: 


 











 01/06/18 01/07/18 01/08/18





 06:59 06:59 06:59


 


Intake Total 


 


Output Total  200 


 


Balance 











Result Diagrams: 


 01/05/18 08:54





 01/07/18 05:44





<Yovani Martinez - Last Filed: 01/08/18 07:54>





- Objective


Vital Signs & Weight: 


 Vital Signs (12 hours)











  Temp Pulse Pulse Pulse Resp BP BP


 


 01/08/18 08:53    61  64   153/68 H  170/68 H


 


 01/08/18 08:11   67     


 


 01/08/18 08:00  98 F  67    18  


 


 01/08/18 07:20  98.4 F  64    20  


 


 01/08/18 02:15   67    16  














  BP BP Pulse Ox


 


 01/08/18 08:53   


 


 01/08/18 08:11   


 


 01/08/18 08:00   


 


 01/08/18 07:20   156/70 H  96


 


 01/08/18 02:15  136/75   96








 Weight











Weight                         49.351 kg














I&O: 


 











 01/07/18 01/08/18 01/09/18





 06:59 06:59 06:59


 


Intake Total 480 1440 


 


Output Total 200  


 


Balance 280 1440 











Result Diagrams: 


 01/05/18 08:54





 01/07/18 05:44





<Juarez Isaac - Last Filed: 01/08/18 11:46>





Phys Exam





- Physical Examination


Constitutional: NAD


HEENT: moist MMs, sclera anicteric


Neck: no JVD, supple, full ROM


Respiratory: no wheezing, no rales, no rhonchi, clear to auscultation bilateral


Cardiovascular: RRR, no significant murmur, no rub


Gastrointestinal: soft, non-tender, no distention


Musculoskeletal: no edema


Neurological: non-focal, normal sensation, moves all 4 limbs


Psychiatric: normal affect, A&O x 3





<Yovani Martinez - Last Filed: 01/08/18 07:54>





Dx/Plan


(1) Symptomatic bradycardia


Code(s): R00.1 - BRADYCARDIA, UNSPECIFIED   Status: Acute   


Plan: 


Considering Sick Sinus Syndrome, currently in NSR and asymptomatic


-Dr. Wiseman with Cardiology has been consulted, appreciate recs.


-Cardiology to consult EP today to assess whether she had a Linq device vs a 

EPS study to evaluate her conduction system


-Echo pending








(2) UTI (urinary tract infection)


Status: Acute   


Plan: 


Start Bactrim and one time PO diflucan given


Cx showed beta hemolytic strep








(3) History of laminectomy


Code(s): Z98.890 - OTHER SPECIFIED POSTPROCEDURAL STATES   Status: Chronic   


Plan: 


pt currently in SNF for PT and rehab, will continue PT here








(4) Macrocytic anemia


Code(s): D53.9 - NUTRITIONAL ANEMIA, UNSPECIFIED   Status: Chronic   


Plan: 


Continue home meds 








(5) Hyponatremia


Code(s): E87.1 - HYPO-OSMOLALITY AND HYPONATREMIA   Status: Acute   


Plan: 


Currently asymptomatic, 132 on admission


Will continue to monitor, tolerating normal diet and PO fluid intake


repeat BMP this morning








<Yovani Martinez - Last Filed: 01/08/18 07:54>





Attending Addendum





- Attending Addendum


I personally evaluated the patient and discussed the management with Dr. Martinez.


I agree with the History, Examination, Assessment and Plan documented above 

with any addition or exceptions noted below.





Patient going for EP study today as she was admitted for symptomatic 

bradycardia but there have been no recorded instances here. There is also 

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 Await further 

recs from EP. No other active issues at this time. She has completed therapy 

for UTI. 








<Juarez Isaac - Last Filed: 01/08/18 11:46>

## 2018-01-08 NOTE — CON
DICTATED BY:  Glenys Burr NP

 

DATE OF CONSULTATION:  01/08/2018

 

REASON FOR CONSULTATION:  Syncope of unknown origin.

 

CONSULTING PHYSICIAN:  Dr. Wiseman

 

HISTORY OF PRESENT ILLNESS:  This is an 88-year-old  female who was 
recently admitted after being difficult to arouse and passing out while at her 
skilled nursing facility.  Reportedly, she was sitting at the breakfast table 
and felt dizzy and the next thing she recalls was waking up with the paramedics 
working on her at the facility.  She denies that there was any dizziness or 
spinning sensation prior to passing out.  She cannot recall this happening 
before, but her son reports an other episode at NH when she was very hard to 
awake.  She denies any history of heart arrhythmias, either fast or slow.  
During evaluation in the emergency room, she had a urinalysis completed which 
was consistent with a urinary tract infection which she is currently being 
treated for.  According to reports, the patient fluctuated between hypotensive 
and tachycardic and bradycardic prior to being brought to the emergency room 
for evaluation.  Also, of note, she has recently had a laminectomy performed 
and has been on Lyrica and tizanidine for muscle cramps since her procedure.  
The patient denies any loss of bowel or bladder control immediately following 
passing out or while passed out.  She has never had any seizure-like activity 
or past history of seizures. 

 

REVIEW OF SYSTEMS:  

CONSTITUTIONAL:  The patient denies fevers, chills, night sweats, malaise or 
her recent weight fluctuations.

NEUROLOGIC:  The patient denies unilaterally weakness, speech changes, vision 
changes, facial droop or paresthesias.

RESPIRATORY:  The patient denies cough, shortness of breath, dyspnea on 
exertion or difficulty breathing while lying flat.  She has not had any recent 
respiratory illness and denies any blood in her sputum.  

CARDIOVASCULAR:  The patient denies any heart racing, palpitations, chest pain 
or pressure, swelling of the extremities.

GI:  The patient denies nausea, vomiting, diarrhea or blood in her stool.

:  The patient was unaware of her UTI.  The patient denies any frequency, pain
, or difficulty with urination.

 Rest of the 12 point review of system is negative.  



PAST MEDICAL HISTORY:  Spinal stenosis, status post laminectomy, urinary 
incontinence, chronic B12 deficiency, anemia and dementia.

 

ALLERGIES:  GABAPENTIN, NORCO, and PROCAINE.

 

SOCIAL HISTORY:  She denies tobacco, alcohol or drug use.

 

MEDICATIONS AT HOME:  Amlodipine, tramadol, multivitamin, vitamin D, donepezil,
  MiraLax, Premarin, oxybutynin, Lyrica and tizanidine.

 

CURRENT MEDICATION LIST:  Tylenol as needed, T3 as needed, amlodipine 5 mg 
b.i.d. Os-Alexis daily, vitamin D daily, Premarin, 0.625 mg daily, Theragran 
multivitamin daily, Zofran as needed, Zanaflex 4 mg q.i.d. as needed for muscle 
spasms, Ultram 50 mg q.i.d. as needed for pain, Bactrim-DS 1 tab p.o. b.i.d.

 

PHYSICAL EXAMINATION:

VITAL SIGNS:  Temperature 98.4 degrees, pulse is 64, respirations are 20, 
oxygen saturation is 96 on room air, blood pressure is 156/70 standing.

GENERAL:  The patient is alert and oriented geriatric female in no acute 
distress.  She appears well-groomed, is an adequate historian and her affect is 
appropriate.

HEENT:  Pupils are equal, round and reactive to light and accommodation.  Her 
sclerae are anicteric.  Mucous membranes are moist.  There is adequate 
dentition. 

NECK:  Supple without lymphadenopathy or thyromegaly.

CARDIOVASCULAR:  She has a regular rate and rhythm without murmurs, rubs or 
gallops.

EXTREMITIES:  There is no swelling of the extremities.  Extremities are warm 
and dry to touch without evidence of clubbing or cyanosis.

PULMONARY:  Her lungs are clear to auscultation bilaterally without wheezes, 
crackles or rhonchi.  There is good bilateral excursion, respirations are even 
and unlabored..

INTEGUMENTARY:  No rashes, petechiae or lesions visible.

NEUROLOGIC:  Grossly intact and without deficit.

 

LABORATORY DATA:  Hematology on 01/05/2018; white blood cells 9.2, hemoglobin 
10.2, hematocrit 29.8, platelet count is 324.  Chemistry panel performed on 01/
07/2018; sodium 134, potassium 4.5, chloride 105, carbon dioxide 24, BUN is 13, 
creatinine 0.81, glucose is 95, calcium is 9.1.  Serial troponins were negative 
since admission.  Urinalysis performed on the 5th was consistent with UTI.  EKG 
on the chart revealed normal sinus rhythm with a left bundle branch block. 

Prelim 2D ECHO results reveal preserved LVEF.

 

IMPRESSION:

1.  Recurrent syncope with unknown origin.

2.  Left bundle branch block.

3.  Hypertension.

4.  Chronic urinary incontinence.

5.  Dementia.

 

The patient did have what appears to be SVT according to strip posted in the 
chart.  However, additional testing is recommended for further evaluation.  
Should the origin of the syncope be cardiac in origin.  There was an 
echocardiogram performed that is currently pending review, but the patient 
appeared to have a preserved ejection fraction.  This discussed at length with 
the patient, the different various treatment options including monitoring or 
slightly more invasive EP study testing options.  



At this point, a recommendation would be to pursue an EP study for further 
evaluation of her conduction system.  Depending on the findings of that study 
we would then pursue either LINQ  implantation and possible pacemaker 
implantation.  The risks and benefits of the procedures were discussed with the 
patient including bleeding and tenderness at the site of the LINQ device 
implantation, in addition to damage concerning tissues, blood vessels or 
nerves.  The risks of the EP study include damage to blood vessels, hematoma, 
pain at the femoral groin access, perforation heart or blood vessels, 
pericardial effusion, cardiac tamponade and possible death in addition to need 
for a pacemaker.  Risks of pacemaker implantation include pain at the implant 
site, damage to discerning blood vessels, structures or nerve endings, 
pneumothorax, perforation of the venous system, perforation of the heart 
requiring additional chest tube or cardiac drain placement or surgical 
intervention.  At this point, the patient verbalizes understanding of the 
procedures and the risks, but is weighing the options and will let us know 
later today what her choice is.  

 

Thank you for allowing us to participate in the care of this patient.

 

NOAM

## 2018-01-09 VITALS — SYSTOLIC BLOOD PRESSURE: 129 MMHG | DIASTOLIC BLOOD PRESSURE: 105 MMHG | TEMPERATURE: 98.2 F

## 2018-01-09 RX ADMIN — MULTIPLE VITAMINS W/ MINERALS TAB SCH TAB: TAB at 09:30

## 2018-01-09 NOTE — DIS-2
DATE OF ADMISSION:  2018

 

DATE OF DISCHARGE:  2018

 

RESIDENT:  Yovani Martinez MD

 

ADMITTING ATTENDING:  Dr. Ayla Wood.

 

DISCHARGE ATTENDING:  Dr. Juarez Isaac.

 

CONSULTS:

1.  Cardiology, Dr. Wiseman.

2.  Electrophysiology, Dr. Artis.

 

PROCEDURES:

1.  Chest x-ray on 2018.  Impression:  Cardiomegaly.

2.  Carotid ultrasound on 2018.  Impression:  No hemodynamically significant stenosis of the le
ft or right internal carotid artery.

3.  EP study on 2018.  Conclusion:  Abnormal EP study with presence of left bundle branch block
, but without extreme H-V prolongation to suggest an imminent need for pacemaker, dual AV martina physi
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  Salena
l silanodal function.  No ventricular arrhythmias, up to 2 ventricular stimuli induced.  Plan to proc
eed with loop recorder implant.

4.  Echocardiogram on 2018.  Summary:  Ejection fraction visually estimated at 45% to 50%, impa
ired relaxation compatible with diastolic dysfunction, mild mitral regurg present, mild tricuspid reg
urgitation present.

5.  Medtronic LINQ insertion.  Successful LINQ recorder implant.

 

PRIMARY DISCHARGE DIAGNOSES:

1.  Syncope.

2.  Symptomatic bradycardia.

3.  Urinary tract infection.

 

DISCHARGE MEDICATIONS:  Resume all home medications includin.  Vitamin D3 one tab p.o. daily.

2.  Multivitamin 1 tab p.o. daily.

3.  Estrogens, conjugated (Premarin 0.625 mg p.o. q.2 day).

4.  Donepezil HCL 10 mg p.o. q.a.m.

5.  Acetaminophen 500 mg capsule 2 tabs p.o. q.4 hours p.r.n.

6.  Tizanidine HCL 4 mg p.o. q.8 hours p.r.n.

7.  Tramadol HCL 1-2 tabs p.o. q.6 hours p.r.n.

8.  Pregabalin 75 mg p.o. b.i.d.

9.  Oxybutynin chloride 2.5 mg p.o. t.i.d.

10.  MiraLax 17 grams p.o. daily.

11.  Amlodipine 5 mg p.o. b.i.d.

 

DISCONTINUE MEDICATIONS:  Zofran 4 mg p.o. q.6 hours p.r.n., Bactrim-DS 1 tab p.o. b.i.d. for 3 days.


 

HISTORY OF PRESENT ILLNESS AND HOSPITAL COURSE:  Leighann Hong is an 88-year-old female with past me
dical history of spinal stenosis, urinary incontinence, chronic B12 deficiency anemia and dementia, w
ho presented to the ED on 2018 after she became very tired at the nursing home and had syncopal
 episode, which was witnessed by nursing facility staff.  Immediately thereafter, the patient was not
ed to have significant tachycardia and was hypotensive.  EMS was called and at the time of EMS arriva
l, the patient's heart rate was in the 40s.  She continued to be hypertensive upon arrival [place] [facility]
[place], her heart rate varied from the 50s to the 110s and blood pressures were stable throughou
t.  She has never had an episode like this.  Has no prior heart history.  The patient was admitted fo
r syncope to tele and was monitored for any arrhythmias.  Cardiology was consulted due to the patient
 having SVT on the strip while on tele, Cardiology recommended EP study.  Electrophysiologist, Dr. Tevin briggs, was consulted.  Study was performed and decision to place LINQ implant, LINQ implant was placed o
n 2018.  The patient tolerated the procedure well.  The patient was asymptomatic and did not ha
ve any syncopal episodes throughout the entire admission.  The patient was cleared for discharge on 0
2018 by both Cardiology and EP with instructions to follow up with either Cardiology or EP.  The
 patient was in agreement with this plan.  All of her questions were answered.  She will be discharge
d back to her nursing home.

 

DISPOSITION:  Stable.

 

DISCHARGE INSTRUCTIONS:

1.  Location:  Nursing home.

2.  Diet:  Heart healthy.

3.  Activity:  As tolerated.

4.  Followup:  Follow up with primary care provider to discuss management of chronic diseases and fol
low up with cardiologist to discuss hospital admission and for LINQ recorder followup.

## 2018-01-09 NOTE — CCL
DATE OF PROCEDURE:  01/08/2018

 

ELECTROPHYSIOLOGY STUDY REPORT

 

REFERRING PHYSICIAN:  Dr. Wiseman.

 

REASON FOR PROCEDURE:  Ms. Siegel is an 88-year-old female with history of 
recurrent syncopal spells.  She has left bundle branch block at baseline here 
for assessing her conduction system and relating for any inducible arrhythmias.

 

PROCEDURE IN DETAIL:  The patient received deep sedation per Anesthesia 
specialist.  After adequate level of sedation achieved in the right femoral vein
, this area was prepped, draped, and anesthetized using subcutaneous lidocaine.
  The right femoral vein was accessed with a multipurpose needle and a 6-Marshallese 
short sheath was introduced with decapolar 6-Marshallese catheter was advanced to 
the right atrium His bundle in the right ventricular position.  Pacing, mapping
, and recording was performed in each location.

 

FINDINGS:  The baseline measurements; the cycle length was 856, sinus rhythm, 
, ,  milliseconds, AH 99 milliseconds, HV measured to 65 
milliseconds.  Sinus node recovery time was 975 milliseconds with corrected 
sinus node recovery time is 125 milliseconds.  The AV Wenckebach cycle length 
is 380 milliseconds.  No retrograde VA conduction was seen at baseline.  AV 
martina ERP was 600/260.  The ventricular ERP was 250/190.  Dual AV martina 
physiology was noted to be patent with burst atrial pacing.  No ST-T or atrial 
arrhythmias induced.  Following that ventricular pacing with up to 2 VES - 
decremented to the refractory period - was  performed and it did not induce 
ventricular tachycardia.



CONCLUSION:

1.  Abnormal EP study with presence of left bundle branch block, but without 
extreme HV prolongation to suggest an imminent need for pacemaker.

2.  Dual AV martina physiology present without inducible SVT and no VA conduction 
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

3.  Normal sinus martina function.

4.  No ventricular arrhythmias, up to 2 ventricular stimuli induced.

 

PLAN:  Proceed with loop recorder implant.

 

POS: [employer]

## 2018-01-09 NOTE — PDOC.FM
- Subjective


Subjective: 





Mrs. Hong states she is feeling well this morning.  She states that she is 

ready to go home today.  She tolerated her EP study and LINQ recorder placement 

well yesterday.  She denies fever, chest pain, palpitations, dyspnea, n/v/d. 





- Objective


MAR Reviewed: Yes


Vital Signs & Weight: 


 Vital Signs (12 hours)











  Temp Pulse Resp BP BP Pulse Ox


 


 01/09/18 04:12  97.5 F L  58 L  14  131/60   98


 


 01/09/18 00:10    16  110/74  


 


 01/08/18 20:00  97.8 F  69  16   139/66  95








 Weight











Weight                         49.169 kg














I&O: 


 











 01/07/18 01/08/18 01/09/18





 06:59 06:59 06:59


 


Intake Total   880


 


Output Total   350


 


Balance   530











Result Diagrams: 


 01/05/18 08:54





 01/07/18 05:44





<Yovani Martinez - Last Filed: 01/09/18 08:58>





- Objective


Vital Signs & Weight: 


 Vital Signs (12 hours)











  Temp Pulse Resp BP Pulse Ox


 


 01/09/18 08:00  98.2 F  67  20  129/105 H  99


 


 01/09/18 04:12  97.5 F L  58 L  14  131/60  98


 


 01/09/18 00:10    16  110/74 








 Weight











Weight                         49.169 kg














I&O: 


 











 01/08/18 01/09/18 01/10/18





 06:59 06:59 06:59


 


Intake Total  880 


 


Output Total  350 


 


Balance  530 











Result Diagrams: 


 01/05/18 08:54





 01/07/18 05:44





<Juarez Isaac R - Last Filed: 01/09/18 11:15>





Phys Exam





- Physical Examination


Constitutional: NAD


HEENT: moist MMs, sclera anicteric


Neck: no JVD, supple, full ROM


Respiratory: no wheezing, no rales, no rhonchi, clear to auscultation bilateral


Cardiovascular: RRR, no significant murmur, no rub


Gastrointestinal: soft, non-tender, no distention


Musculoskeletal: no edema, pulses present


Neurological: non-focal, normal sensation, moves all 4 limbs


Psychiatric: normal affect, A&O x 3





<Yovani Martinez - Last Filed: 01/09/18 08:58>





Dx/Plan


(1) Symptomatic bradycardia


Code(s): R00.1 - BRADYCARDIA, UNSPECIFIED   Status: Acute   


Plan: 


Considering Sick Sinus Syndrome, currently in NSR and asymptomatic


-Dr. Wiseman with Cardiology has been consulted, appreciate recs.


-EP, Dr. Artis, study yesterday, no inducible arrhythmia, LINQ recorder placed


-Echo: EF 45-50%, diastolic dysfunction








(2) UTI (urinary tract infection)


Status: Acute   


Plan: 


Completed 3 day tx with Bactrim yesterday


Cx showed beta hemolytic strep








(3) History of laminectomy


Code(s): Z98.890 - OTHER SPECIFIED POSTPROCEDURAL STATES   Status: Chronic   


Plan: 


pt currently in SNF for PT and rehab, will continue PT here








(4) Macrocytic anemia


Code(s): D53.9 - NUTRITIONAL ANEMIA, UNSPECIFIED   Status: Chronic   


Plan: 


Continue home meds 








(5) Hyponatremia


Code(s): E87.1 - HYPO-OSMOLALITY AND HYPONATREMIA   Status: Acute   


Plan: 


Currently asymptomatic, 132 on admission


Will continue to monitor, tolerating normal diet and PO fluid intake


repeat BMP this morning








<Yovani Martinez - Last Filed: 01/09/18 08:58>





Attending Addendum





- Attending Addendum


I personally evaluated the patient and discussed the management with Dr. Martinez.


I agree with the History, Examination, Assessment and Plan documented above 

with any addition or exceptions noted below.





Patient doing well today. No events on telemetry and had overall normal EP 

study with exception of LBBB. She had event recorder placed and is stable for 

discharge today back to SNF. 








<Juarez Isaac - Last Filed: 01/09/18 11:15>

## 2018-01-09 NOTE — OP
DATE OF SERVICE:  01/08/2018

 

This is a loop recorder implant for Leighann Hong.

 

REFERRING PHYSICIAN:  Dr. Wiseman.

 

REASON FOR PROCEDURE:  Ms. Hong is an 88-year-old female with recurrent syncope.  EP study today 
reveals no inducible arrhythmias.  H-interval is less than 70 milliseconds, no definite indication fo
r pacing implantation is present.  She is here for loop recorder for further monitoring.

 

PROCEDURE:  The patient was under sedation from  Anesthesia.  After an adequate level of sedation ach
ieved, the left precordial 4th intercostal space was prepped, draped, and anesthetized using subcutan
eous lidocaine with a standard [employer]tr[employer] LINQ insertion tool kit.  The LINQ recorder was inserted wit
hout difficulty.

 

CONCLUSION:  Successful LINQ recorder implant.

 

PLAN:  Continue routine monitoring.

## 2018-03-10 ENCOUNTER — HOSPITAL ENCOUNTER (INPATIENT)
Dept: HOSPITAL 92 - SCSER | Age: 83
LOS: 4 days | Discharge: HOME | DRG: 287 | End: 2018-03-14
Attending: HOSPITALIST | Admitting: HOSPITALIST
Payer: MEDICARE

## 2018-03-10 VITALS — BODY MASS INDEX: 19.7 KG/M2

## 2018-03-10 DIAGNOSIS — B96.20: ICD-10-CM

## 2018-03-10 DIAGNOSIS — S01.81XA: ICD-10-CM

## 2018-03-10 DIAGNOSIS — N18.2: ICD-10-CM

## 2018-03-10 DIAGNOSIS — R32: ICD-10-CM

## 2018-03-10 DIAGNOSIS — M54.5: ICD-10-CM

## 2018-03-10 DIAGNOSIS — I47.2: Primary | ICD-10-CM

## 2018-03-10 DIAGNOSIS — Z23: ICD-10-CM

## 2018-03-10 DIAGNOSIS — E53.8: ICD-10-CM

## 2018-03-10 DIAGNOSIS — I47.1: ICD-10-CM

## 2018-03-10 DIAGNOSIS — Z88.8: ICD-10-CM

## 2018-03-10 DIAGNOSIS — F03.90: ICD-10-CM

## 2018-03-10 DIAGNOSIS — E87.1: ICD-10-CM

## 2018-03-10 DIAGNOSIS — I13.0: ICD-10-CM

## 2018-03-10 DIAGNOSIS — I50.32: ICD-10-CM

## 2018-03-10 DIAGNOSIS — I25.10: ICD-10-CM

## 2018-03-10 DIAGNOSIS — R55: ICD-10-CM

## 2018-03-10 DIAGNOSIS — N30.00: ICD-10-CM

## 2018-03-10 DIAGNOSIS — I08.1: ICD-10-CM
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
## 2018-03-10 DIAGNOSIS — I44.7: ICD-10-CM
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
## 2018-03-10 LAB
ALBUMIN SERPL BCG-MCNC: 4.3 G/DL (ref 3.4–4.8)
ALP SERPL-CCNC: 58 U/L (ref 40–150)
ALT SERPL W P-5'-P-CCNC: 14 U/L (ref 8–55)
ANION GAP SERPL CALC-SCNC: 15 MMOL/L (ref 10–20)
AST SERPL-CCNC: 18 U/L (ref 5–34)
BACTERIA UR QL AUTO: (no result) HPF
BASOPHILS # BLD AUTO: 0.1 THOU/UL (ref 0–0.2)
BASOPHILS NFR BLD AUTO: 1.4 % (ref 0–1)
BILIRUB SERPL-MCNC: 0.6 MG/DL (ref 0.2–1.2)
BUN SERPL-MCNC: 17 MG/DL (ref 9.8–20.1)
CALCIUM SERPL-MCNC: 9.9 MG/DL (ref 7.8–10.44)
CHLORIDE SERPL-SCNC: 96 MMOL/L (ref 98–107)
CK MB SERPL-MCNC: 1.9 NG/ML (ref 0–6.6)
CK SERPL-CCNC: 40 U/L (ref 29–168)
CO2 SERPL-SCNC: 27 MMOL/L (ref 23–31)
CREAT CL PREDICTED SERPL C-G-VRATE: 0 ML/MIN (ref 70–130)
EOSINOPHIL # BLD AUTO: 0.1 THOU/UL (ref 0–0.7)
EOSINOPHIL NFR BLD AUTO: 1.4 % (ref 0–10)
GLOBULIN SER CALC-MCNC: 3.3 G/DL (ref 2.4–3.5)
GLUCOSE SERPL-MCNC: 103 MG/DL (ref 83–110)
HGB BLD-MCNC: 13.2 G/DL (ref 12–16)
LYMPHOCYTES # BLD: 1.1 THOU/UL (ref 1.2–3.4)
LYMPHOCYTES NFR BLD AUTO: 22.1 % (ref 21–51)
MAGNESIUM SERPL-MCNC: 3 MG/DL (ref 1.6–2.6)
MCH RBC QN AUTO: 32.5 PG (ref 27–31)
MCV RBC AUTO: 92.6 FL (ref 81–99)
MONOCYTES # BLD AUTO: 0.6 THOU/UL (ref 0.11–0.59)
MONOCYTES NFR BLD AUTO: 11 % (ref 0–10)
NEUTROPHILS # BLD AUTO: 3.3 THOU/UL (ref 1.4–6.5)
NEUTROPHILS NFR BLD AUTO: 64.1 % (ref 42–75)
PLATELET # BLD AUTO: 238 THOU/UL (ref 130–400)
POTASSIUM SERPL-SCNC: 4.7 MMOL/L (ref 3.5–5.1)
RBC # BLD AUTO: 4.04 MILL/UL (ref 4.2–5.4)
RBC UR QL AUTO: (no result) HPF (ref 0–3)
SODIUM SERPL-SCNC: 133 MMOL/L (ref 136–145)
SP GR UR STRIP: 1.01 (ref 1–1.03)
TROPONIN I SERPL DL<=0.01 NG/ML-MCNC: (no result) NG/ML (ref ?–0.03)
TROPONIN I SERPL DL<=0.01 NG/ML-MCNC: 0.01 NG/ML (ref ?–0.03)
WBC # BLD AUTO: 5.1 THOU/UL (ref 4.8–10.8)
WBC UR QL AUTO: (no result) HPF (ref 0–3)

## 2018-03-10 PROCEDURE — 99153 MOD SED SAME PHYS/QHP EA: CPT

## 2018-03-10 PROCEDURE — 87186 SC STD MICRODIL/AGAR DIL: CPT

## 2018-03-10 PROCEDURE — 93005 ELECTROCARDIOGRAM TRACING: CPT

## 2018-03-10 PROCEDURE — 87077 CULTURE AEROBIC IDENTIFY: CPT

## 2018-03-10 PROCEDURE — 36415 COLL VENOUS BLD VENIPUNCTURE: CPT

## 2018-03-10 PROCEDURE — 80053 COMPREHEN METABOLIC PANEL: CPT

## 2018-03-10 PROCEDURE — 84100 ASSAY OF PHOSPHORUS: CPT

## 2018-03-10 PROCEDURE — 93798 PHYS/QHP OP CAR RHAB W/ECG: CPT

## 2018-03-10 PROCEDURE — 87086 URINE CULTURE/COLONY COUNT: CPT

## 2018-03-10 PROCEDURE — 81015 MICROSCOPIC EXAM OF URINE: CPT

## 2018-03-10 PROCEDURE — 99152 MOD SED SAME PHYS/QHP 5/>YRS: CPT

## 2018-03-10 PROCEDURE — 85025 COMPLETE CBC W/AUTO DIFF WBC: CPT

## 2018-03-10 PROCEDURE — 85730 THROMBOPLASTIN TIME PARTIAL: CPT

## 2018-03-10 PROCEDURE — 80048 BASIC METABOLIC PNL TOTAL CA: CPT

## 2018-03-10 PROCEDURE — 93010 ELECTROCARDIOGRAM REPORT: CPT

## 2018-03-10 PROCEDURE — 93458 L HRT ARTERY/VENTRICLE ANGIO: CPT

## 2018-03-10 PROCEDURE — 90715 TDAP VACCINE 7 YRS/> IM: CPT

## 2018-03-10 PROCEDURE — 83735 ASSAY OF MAGNESIUM: CPT

## 2018-03-10 PROCEDURE — C1760 CLOSURE DEV, VASC: HCPCS

## 2018-03-10 PROCEDURE — 96365 THER/PROPH/DIAG IV INF INIT: CPT

## 2018-03-10 PROCEDURE — 90471 IMMUNIZATION ADMIN: CPT

## 2018-03-10 PROCEDURE — 82553 CREATINE MB FRACTION: CPT

## 2018-03-10 PROCEDURE — 84484 ASSAY OF TROPONIN QUANT: CPT

## 2018-03-10 PROCEDURE — 81003 URINALYSIS AUTO W/O SCOPE: CPT

## 2018-03-10 PROCEDURE — 82550 ASSAY OF CK (CPK): CPT

## 2018-03-10 PROCEDURE — A4216 STERILE WATER/SALINE, 10 ML: HCPCS

## 2018-03-10 PROCEDURE — 12032 INTMD RPR S/A/T/EXT 2.6-7.5: CPT

## 2018-03-10 PROCEDURE — 72125 CT NECK SPINE W/O DYE: CPT

## 2018-03-10 PROCEDURE — C1769 GUIDE WIRE: HCPCS

## 2018-03-10 PROCEDURE — 85610 PROTHROMBIN TIME: CPT

## 2018-03-10 PROCEDURE — 70450 CT HEAD/BRAIN W/O DYE: CPT

## 2018-03-10 PROCEDURE — 80069 RENAL FUNCTION PANEL: CPT

## 2018-03-10 NOTE — CT
CT BRAIN:

 

Date:  03/10/18

 

PROVIDED CLINICAL HISTORY:   

Altered mental status. 

 

FINDINGS:

The ventricular system is mildly prominent in size, likely on the basis of central atrophy. There is 
no evidence for intracranial hemorrhage or mass effect. The extracranial soft tissues and osseous str
uctures demonstrate no acute abnormality. 

 

IMPRESSION: 

No evidence for intracranial hemorrhage or mass effect. 

 

 

POS: JUANA LBERTO

## 2018-03-10 NOTE — CT
CT CERVICAL SPINE  

3/10/18

 

PROVIDED CLINICAL HISTORY:  

Altered mental status, fall, hit head.

 

FINDINGS:  

There is no evidence for fracture or traumatic subluxation. Advanced multilevel cervical degenerative
 changes are noted with likely degenerative anterolisthesis of C2 on C3 and C3 on C4. No prevertebral
 soft tissue swelling apparent. The visualized lung apices appear clear. 

 

IMPRESSION:  

No evidence for fracture or traumatic subluxation. 

 

POS: JUAN ALBERTO

## 2018-03-11 LAB
ALBUMIN SERPL BCG-MCNC: 3.5 G/DL (ref 3.4–4.8)
ANION GAP SERPL CALC-SCNC: 10 MMOL/L (ref 10–20)
BASOPHILS # BLD AUTO: 0 THOU/UL (ref 0–0.2)
BASOPHILS NFR BLD AUTO: 0.6 % (ref 0–1)
BUN SERPL-MCNC: 16 MG/DL (ref 9.8–20.1)
BUN/CREAT SERPL: 25
CALCIUM SERPL-MCNC: 9 MG/DL (ref 7.8–10.44)
CHLORIDE SERPL-SCNC: 101 MMOL/L (ref 98–107)
CO2 SERPL-SCNC: 25 MMOL/L (ref 23–31)
CREAT CL PREDICTED SERPL C-G-VRATE: 48 ML/MIN (ref 70–130)
EOSINOPHIL # BLD AUTO: 0.1 THOU/UL (ref 0–0.7)
EOSINOPHIL NFR BLD AUTO: 2.3 % (ref 0–10)
GLUCOSE SERPL-MCNC: 98 MG/DL (ref 83–110)
HGB BLD-MCNC: 12 G/DL (ref 12–16)
LYMPHOCYTES # BLD: 2.1 THOU/UL (ref 1.2–3.4)
LYMPHOCYTES NFR BLD AUTO: 34.2 % (ref 21–51)
MAGNESIUM SERPL-MCNC: 2.6 MG/DL (ref 1.6–2.6)
MCH RBC QN AUTO: 34.2 PG (ref 27–31)
MCV RBC AUTO: 96.4 FL (ref 81–99)
MONOCYTES # BLD AUTO: 0.7 THOU/UL (ref 0.11–0.59)
MONOCYTES NFR BLD AUTO: 11.1 % (ref 0–10)
NEUTROPHILS # BLD AUTO: 3.1 THOU/UL (ref 1.4–6.5)
NEUTROPHILS NFR BLD AUTO: 51.8 % (ref 42–75)
PLATELET # BLD AUTO: 232 THOU/UL (ref 130–400)
POTASSIUM SERPL-SCNC: 4.2 MMOL/L (ref 3.5–5.1)
RBC # BLD AUTO: 3.51 MILL/UL (ref 4.2–5.4)
SODIUM SERPL-SCNC: 132 MMOL/L (ref 136–145)
WBC # BLD AUTO: 6 THOU/UL (ref 4.8–10.8)

## 2018-03-11 RX ADMIN — ASPIRIN SCH: 81 TABLET ORAL at 09:22

## 2018-03-11 RX ADMIN — Medication SCH: at 20:50

## 2018-03-11 RX ADMIN — Medication SCH: at 09:23

## 2018-03-11 NOTE — PDOC.PN
- Subjective


Encounter Start Date: 03/11/18


Encounter Start Time: 07:30


Subjective: awake, oriented well


-: no chest pain, palp or sob now





- Objective


Resuscitation Status: 


 











Resuscitation Status           FULL:Full Resuscitation














MAR Reviewed: Yes


Vital Signs & Weight: 


 Vital Signs (12 hours)











  Temp Pulse Resp BP Pulse Ox


 


 03/11/18 11:57  97.9 F  76  18  147/87 H  96


 


 03/11/18 08:28  97.5 F L  68  18   100


 


 03/11/18 07:44  97.5 F L  68  18  164/77 H  100


 


 03/11/18 06:34      100


 


 03/11/18 05:07  97.8 F  70   178/77 H  100


 


 03/11/18 00:49  97.6 F  76   167/72 H  100








 Weight











Weight                         111 lb 5.335 oz














I&O: 


 











 03/10/18 03/11/18 03/12/18





 05:59 06:59 06:59


 


Intake Total   


 


Balance   











Result Diagrams: 


 03/11/18 03:31





 03/11/18 03:31





Phys Exam





- Physical Examination


HEENT: PERRLA, sclera anicteric


Neck: no JVD, supple


Respiratory: no wheezing, no rales


Cardiovascular: RRR, no significant murmur


Gastrointestinal: soft, non-tender, positive bowel sounds


Musculoskeletal: no edema, pulses present


Neurological: non-focal, moves all 4 limbs


Psychiatric: A&O x 3





Dx/Plan


(1) Ventricular tachycardia


Code(s): I47.2 - VENTRICULAR TACHYCARDIA   Status: Acute   





(2) Syncope


Code(s): R55 - SYNCOPE AND COLLAPSE   Status: Acute   





(3) UTI (urinary tract infection)


Status: Acute   


Qualifiers: 


   Urinary tract infection type: acute cystitis   Hematuria presence: without 

hematuria   Qualified Code(s): N30.00 - Acute cystitis without hematuria   





(4) Dementia


Code(s): F03.90 - UNSPECIFIED DEMENTIA WITHOUT BEHAVIORAL DISTURBANCE   Status: 

Chronic   


Qualifiers: 


   Dementia type: unspecified type   Dementia behavioral disturbance: without 

behavioral disturbance   Qualified Code(s): F03.90 - Unspecified dementia 

without behavioral disturbance   





- Plan


on lopressor, asp, gentle iv hydration


-: ceftriaxone for uti


-: cardio consultation, has linq monitor


-: prior ef of 45%





* .








Review of Systems





- Medications/Allergies


Allergies/Adverse Reactions: 


 Allergies











Allergy/AdvReac Type Severity Reaction Status Date / Time


 


gabapentin Allergy  JITTERY Verified 03/10/18 22:34


 


hydrocodone Allergy  MAKES ME Verified 03/10/18 22:34





   CRAZY  


 


procaine HCl [From Novocain] Allergy  JITTERS Verified 03/10/18 22:34











Medications: 


 Current Medications





Acetaminophen (Tylenol)  650 mg PO Q4H PRN


   PRN Reason: Headache/Fever or Mild Pain


   Last Admin: 03/11/18 06:30 Dose:  650 mg


Al Hydroxide/Mg Hydroxide (Maalox)  30 ml PO Q6H PRN


   PRN Reason: Heartburn  or Indigestion


Aspirin (Ecotrin)  81 mg PO DAILY [facility]


   Last Admin: 03/11/18 09:22 Dose:  Not Given


Calcium Carbonate (Tums)  1,000 mg PO Q4H PRN


   PRN Reason: Heartburn  or Indigestion


Hydralazine HCl (Apresoline)  5 mg SLOW IVP Q4H PRN


   PRN Reason: SBP Greater Than 180


Sodium Chloride (Normal Saline 0.9%)  1,000 mls @ 50 mls/hr IV .Q20H [facility]


   Stop: 03/11/18 23:01


   Last Admin: 03/11/18 03:31 Dose:  Not Given


Ceftriaxone Sodium 1 gm/ (Syringe)  10 mls @ 120 mls/hr SLOW IVP Q24HR [facility]


Labetalol HCl (Normodyne)  10 mg SLOW IVP Q1H PRN


   PRN Reason: Systolic BP > 180


Magnesium Hydroxide (Milk Of Magnesium)  30 ml PO DAILYPRN PRN


   PRN Reason: Constipation


Metoprolol Tartrate (Lopressor)  12.5 mg PO BID [facility]


   Last Admin: 03/11/18 09:17 Dose:  12.5 mg


Nitroglycerin (Nitrostat)  0.4 mg PO Q5MIN PRN


   PRN Reason: Chest Pain


Oxybutynin Chloride (Ditropan)  2.5 mg PO TID [facility]


   Last Admin: 03/11/18 09:23 Dose:  Not Given


Polyethylene Glycol (Miralax)  17 gm PO DAILY [facility]


   Last Admin: 03/11/18 09:22 Dose:  Not Given


Pregabalin (Lyrica)  75 mg PO BID [facility]


   Last Admin: 03/11/18 09:17 Dose:  75 mg


Senna (Senokot)  2 tab PO HSPRN PRN


   PRN Reason: Constipation


Sodium Chloride (Flush - Normal Saline)  10 ml IVF Q12HR [facility]


   Last Admin: 03/11/18 09:23 Dose:  Not Given


Sodium Chloride (Flush - Normal Saline)  10 ml IVF PRN PRN


   PRN Reason: Saline Flush

## 2018-03-11 NOTE — HP
DATE OF ADMISSION:  03/10/2018

 

The patient was seen and examined on 03/10/2018

 

CHIEF COMPLAINT:  Syncopal episode.

 

HISTORY OF PRESENT ILLNESS:  The patient is an 88-year-old female followed by pres Deena
ented to the emergency room after an episode of syncope earlier today.  Please note that the patient 
was admitted at this facility in January of this year under the resident's team for bradycardia.  A L
INQ recorder was placed after an EP study.

 

The patient had a syncopal episode earlier today while she was sitting on the chair.  She is unable t
o recall the exact details.  She fell and hit her head.  She denies any chest pain, shortness of rodolfo
th, palpitations at this time.  A [employer] interrogation of the LINQ recorder was consistent with ve
ntricular tachycardia in the emergency room.  She denies any double vision, blurring of vision, facia
l asymmetry, weakness, numbness of any of her extremity.  A CT scan of the brain in the emergency jigna
m was negative.  Cervical spine CT was negative for acute fractures or dislocation.  Urinalysis showe
d 21-50 wbc's with 3+ bacteria.  She received a ceftriaxone, tetanus toxoid and Bactrim in the emerge
ncy room.  She also had laceration repair over her forehead.

 

PAST MEDICAL HISTORY:

1.  Recent hospitalization for syncope/symptomatic bradycardia.

2.  Status post LINQ recorder placement earlier this year.

3.  Chronic low back pain.

4.  Urinary incontinence.

5.  Dementia.

6.  Vitamin B12 deficiency.

 

PAST SURGICAL HISTORY:

1.  L3-L5 laminectomy.

2.  Hysterectomy.

3.  Bladder lift.

4.  Recent LINQ recorder placement.

 

ALLERGIES:  The patient is allergic to NORCO, PROCAINE, and GABAPENTIN.

 

CURRENT HOME MEDICATIONS:  The patient is unable to recall all of her medications.  She states that a
mlodipine has been recently discontinued.  We will try to obtain accurate list of medications.

 

SOCIAL HISTORY:  The patient currently lives at home.  She is FULL CODE.  Makes her own decision.  De
nies any alcohol, tobacco or drug use.

 

FAMILY HISTORY:  Negative for premature coronary artery disease.

 

REVIEW OF SYSTEMS:  The following complete review of systems was negative, unless otherwise mentioned
 in the HPI or below:

Constitutional:  Weight loss or gain, ability to conduct usual activities.

Skin:  Rash, itching.

Eyes:  Double vision, pain.

ENT/Mouth:  Nose bleeding, neck stiffness, pain, tenderness.

Cardiovascular:  Palpitations, dyspnea on exertion, orthopnea.

Respiratory:  Shortness of breath, wheezing, cough, hemoptysis, fever or night sweats.

Gastrointestinal:  Poor appetite, abdominal pain, heartburn, nausea, vomiting, constipation, or diarr
hea.

Genitourinary:  Urgency, frequency, dysuria, nocturia.

Musculoskeletal:  Pain, swelling.

Neurologic/Psychiatric:  Anxiety, depression.

Allergy/Immunologic:  Skin rash, bleeding tendency.

 

PHYSICAL EXAMINATION:

VITAL SIGNS:  In the emergency room showed temperature 98.1, respirations 17, pulse of 71, blood pres
sure 166/73 with O2 saturation 95% on room air.

GENERAL:  An 88-year-old female, in no apparent distress.

HEENT:  Laceration noted over the right side of the forehead, which was repaired in the emergency jigna
m.  Moist mucous membrane, no oral lesion.

NECK:  Supple, no JVD, no carotid bruit.

LUNGS:  Clear to auscultation bilaterally.

HEART:  S1, S2 present.  Regular rate and rhythm.  No rubs or gallops.

ABDOMEN:  Soft, nontender, bowel sounds present.

EXTREMITIES:  No edema or calf tenderness.

NEUROLOGIC:  Cranial nerves II-XII were normal on examination.  Power was 5/5 in all extremities.  Se
nsation to touch was normal bilaterally.

PSYCHIATRY:  The patient is alert, awake, oriented.

SKIN:  Warm and dry.

LYMPH NODES:  No palpable lymph nodes in the neck.

PERIPHERAL VASCULAR:  Radial pulses palpable bilaterally.

MUSCULOSKELETAL:  No joint swelling or tenderness.

 

LABORATORY FINDINGS:

1.  CBC showed WBC 5.1 with hemoglobin 13.2, platelet 238.

2.  Magnesium of 3.0.  Please note that magnesium was drawn during magnesium infusion.

3.  Troponins were negative.

4.  Sodium 133.  Please note that patient has chronic hyponatremia.  Creatinine was 0.7.

5.  LFTs in normal range.

6.  CT scan of the brain by my review as discussed above.

7.  Recent echocardiogram from January showed ejection fraction 45% to 50% with diastolic dysfunction
, mild mitral regurgitation, mild tricuspid regurgitation.

 

IMPRESSION:

1.  Syncope secondary to ventricular tachycardia.

2.  Recent hospitalization for symptomatic bradycardia, status post EP study and LINQ recorder placem
ent.

3.  Urinary tract infection.

4.  Chronic diastolic heart failure, compensated.

5.  Chronic low back pain.

6.  Chronic urinary incontinence.

7.  Dementia.

8.  Chronic kidney disease, stage 2.

9.  Chronic hyponatremia.

10.  Laceration over the forehead secondary to #1.

 

PLAN:

1.  The patient will be monitored in the intermediate care unit.  Due to ventricular tachycardia, we 
will start the patient on metoprolol 12.5 mg twice a day.  The patient will require careful monitorin
g due to recent hospitalization for symptomatic bradycardia.  Cardiology will be consulted.  The jl
ent will be kept n.p.o. past midnight.  We will continue antibiotics for urinary tract infection.  We
 will recheck magnesium in the emergency room since the magnesium was drawn during the infusion.

2.  Fall precautions.

3. Vital signs per protocol.

4.  We will confirm home medications and start accordingly.

 

Plan of care was discussed with the patient in detail.  She stated understanding.

## 2018-03-11 NOTE — CON
DATE OF CONSULTATION:  03/11/2018

 

REASON FOR CONSULTATION:  Syncope.

 

HISTORY OF PRESENT ILLNESS:  Ms. Hong is a very pleasant 88-year-old woman who is a patient of Dr Trenton Wiseman.  She recently presented with syncope.  She has had syncope in the past.  She had an 
EP study that was negative.  She then underwent a kind of loop recorder.  Loop recorder did reveal 2 
episodes of ventricular tachycardia with one episode occurred on 02/22/2018 and second occurred on 03
/10/2018.  The first episode, patient had heart rate of 163.  Second episode, heart rate of 143.  Syn
cope was associated with her episode.  She denies chest pain, pressure, or previous history of underl
katia coronary artery disease.

 

PAST MEDICAL HISTORY:  As above including low back pain, mild dementia, vitamin B12 deficiency.

 

PAST SURGICAL HISTORY:  Laminectomy, hysterectomy, bladder lift.

 

ALLERGIES:  NORCO, PROCAINE, GABAPENTIN.

 

HOME MEDICATIONS:  Triamterene/hydrochlorothiazide, Lyrica, MiraLax, multivitamin, vitamin D3.

 

REVIEW OF SYSTEMS:  Ten point systems reviewed and as above, otherwise negative.

 

PHYSICAL EXAMINATION:

GENERAL:  Patient is a pleasant female who is in no acute distress.  The patient appears her stated a
ge.

VITAL SIGNS:  Blood pressure 147/87, pulse 76, temperature 97.9.

NEUROLOGIC:  The patient is alert and oriented times 3 with no focal neurologic deficits.

HEENT:  Sclerae without icterus.  Mouth has moist mucous membranes with normal pallor.

NECK:  No JVD.  Carotid upstroke brisk.  No bruits bilaterally.

LUNGS:  Clear to auscultation with unlabored respirations.

BACK:  No scoliosis or kyphosis.

CARDIAC:  Regular rate and rhythm with normal S1 and S2.  No S3 or S4 noted.  No significant rubs, mu
rmurs, thrills, or gallops noted throughout the precordium.  PMI is not displaced.  There is no mary
ternal heave.

ABDOMEN:  Soft, nontender, nondistended.  No peritoneal signs present.

No hepatosplenomegaly.  No abnormal striae.

EXTREMITIES:  2+ femoral and 2+ dorsalis pedis pulses.  No cyanosis, clubbing, or edema.

SKIN:  No gross abnormalities.

 

PERTINENT LABORATORY DATA:  Hemoglobin 12, white blood cell count 6.0, creatinine 0.64, magnesium 2.6
, and potassium 4.2.

 

IMPRESSION:

1.  Syncope.

2.  Nonsustained ventricular tachycardia.

 

RECOMMENDATIONS:  There is certainly concern for underlying coronary artery disease as the etiology t
o her nonsustained VT.  Would recommend coronary angiography plus PCI.  I have discussed the procedur
e in full detail with both patient and her .  The risks of the procedure were also discussed. 
 The risks of the procedure include but are not limited to the following:  Death, stroke, MI, need fo
r emergency surgery, loss of limb, bleeding, and infection, as well as a reaction to the dye causing 
kidney failure and needing long-term dialysis.  I also discussed the risks of PCI to include all of t
he above including coronary dissection and perforation in addition to acute stent thrombosis and rest
enosis.  All questions about the procedure were answered.  Given the above, the patient agreed to pro
ceed with coronary angiography and possible PCI.  All questions were answered.  Also discussed drug-c
oated versus nondrug stent placement.  There is no contraindication and proceed as needed.  If this i
s negative, would then proceed with possible ICD placement.  Further recommendation depending the abo
ve.

## 2018-03-11 NOTE — CON
DATE OF CONSULTATION:  03/11/2018

 

HISTORY OF PRESENT ILLNESS:  Ms. Hong is an 88-year-old female who had 
syncope.  She actually has a forehead laceration that has been sutured.

 

Loop recorder showed V-tach.  I suspected that one of the two episodes that 
were actually over 2 weeks apart led to syncope, which led to her admission.  
She has no complaints at this time.

 

PAST MEDICAL AND SURGICAL HISTORY:  Remarkable for laminectomy, hysterectomy 
and bladder suspension surgery in the past.  She recently underwent an EP study.

 

ALLERGIES:  She reports allergies to NORCO, PROCAINE, and GABAPENTIN.

 

MEDICATIONS:  She is on triamterene, hydrochlorothiazide, Lyrica and MiraLax.

 

SOCIAL HISTORY:  She is a nonsmoker, nondrinker, nondrug user.

 

FAMILY HISTORY:  Negative for lung disease in early age.

 

REVIEW OF SYSTEMS:  Twelve-point review of systems otherwise completely 
negative with the exception of above.

 

Her family is in the room and they said there were no other issues that she was 
complaining about.

 

PHYSICAL EXAMINATION:

GENERAL:  Pt is a  pleasant 89y/o female.

VITAL SIGNS:  She is afebrile, heart rate is 76, respiratory rate is 18, 
oximetry is 96% on room air and blood pressure 147/87.  She has ecchymoses over 
right forehead with a sutured laceration.

HEENT:  Pupils are equal.  Sclerae is anicteric.

NECK:  Supple.  No lymphadenopathy.

LUNGS:  Clear.

HEART:  Regular rhythm.  S1 and S2 are normal.

ABDOMEN:  Soft and nontender.

EXTREMITIES:  Without clubbing, cyanosis or edema.

 

LABORATORY DATA:  White count 6, hemoglobin 12 and platelets 232.  Sodium 132, 
potassium 4.2, chloride 101, bicarb 25, BUN 16 and creatinine 0.64.

 

IMPRESSION AND PLAN:  Status post syncope associated with nonsustained 
ventricular tachycardia.  She is tentatively on the cath scheduled for cardiac 
catheterization in the morning.  She did not have abnormal cardiac enzymes.

 

I will be happy to follow while she is in the intermediate care unit.  There 
are no acute pulmonary issues.  She appears to be stable.  I met with the 
family and answered all their questions.

 

This is a 50-minute consult, 50% of the time was spent on the unit coordinating 
care.

 

NOAM

## 2018-03-12 LAB
ALBUMIN SERPL BCG-MCNC: 3.5 G/DL (ref 3.4–4.8)
ANION GAP SERPL CALC-SCNC: 9 MMOL/L (ref 10–20)
BASOPHILS # BLD AUTO: 0.1 THOU/UL (ref 0–0.2)
BASOPHILS NFR BLD AUTO: 1.3 % (ref 0–1)
BUN SERPL-MCNC: 15 MG/DL (ref 9.8–20.1)
BUN/CREAT SERPL: 21.43
CALCIUM SERPL-MCNC: 8.9 MG/DL (ref 7.8–10.44)
CHLORIDE SERPL-SCNC: 102 MMOL/L (ref 98–107)
CO2 SERPL-SCNC: 27 MMOL/L (ref 23–31)
CREAT CL PREDICTED SERPL C-G-VRATE: 44 ML/MIN (ref 70–130)
EOSINOPHIL # BLD AUTO: 0.2 THOU/UL (ref 0–0.7)
EOSINOPHIL NFR BLD AUTO: 3.8 % (ref 0–10)
GLUCOSE SERPL-MCNC: 98 MG/DL (ref 83–110)
HGB BLD-MCNC: 12.3 G/DL (ref 12–16)
LYMPHOCYTES # BLD: 2.1 THOU/UL (ref 1.2–3.4)
LYMPHOCYTES NFR BLD AUTO: 48.2 % (ref 21–51)
MAGNESIUM SERPL-MCNC: 2.1 MG/DL (ref 1.6–2.6)
MCH RBC QN AUTO: 33.5 PG (ref 27–31)
MCV RBC AUTO: 96.7 FL (ref 81–99)
MONOCYTES # BLD AUTO: 0.5 THOU/UL (ref 0.11–0.59)
MONOCYTES NFR BLD AUTO: 11.5 % (ref 0–10)
NEUTROPHILS # BLD AUTO: 1.6 THOU/UL (ref 1.4–6.5)
NEUTROPHILS NFR BLD AUTO: 35.2 % (ref 42–75)
PLATELET # BLD AUTO: 244 THOU/UL (ref 130–400)
POTASSIUM SERPL-SCNC: 4.5 MMOL/L (ref 3.5–5.1)
RBC # BLD AUTO: 3.68 MILL/UL (ref 4.2–5.4)
SODIUM SERPL-SCNC: 133 MMOL/L (ref 136–145)
WBC # BLD AUTO: 4.4 THOU/UL (ref 4.8–10.8)

## 2018-03-12 PROCEDURE — B2151ZZ FLUOROSCOPY OF LEFT HEART USING LOW OSMOLAR CONTRAST: ICD-10-PCS | Performed by: INTERNAL MEDICINE

## 2018-03-12 PROCEDURE — B2111ZZ FLUOROSCOPY OF MULTIPLE CORONARY ARTERIES USING LOW OSMOLAR CONTRAST: ICD-10-PCS | Performed by: INTERNAL MEDICINE

## 2018-03-12 PROCEDURE — 0HQ1XZZ REPAIR FACE SKIN, EXTERNAL APPROACH: ICD-10-PCS | Performed by: INTERNAL MEDICINE

## 2018-03-12 PROCEDURE — 4A023N7 MEASUREMENT OF CARDIAC SAMPLING AND PRESSURE, LEFT HEART, PERCUTANEOUS APPROACH: ICD-10-PCS | Performed by: INTERNAL MEDICINE

## 2018-03-12 RX ADMIN — SULFAMETHOXAZOLE AND TRIMETHOPRIM SCH TAB: 800; 160 TABLET ORAL at 09:44

## 2018-03-12 RX ADMIN — Medication SCH ML: at 20:13

## 2018-03-12 RX ADMIN — ASPIRIN SCH MG: 81 TABLET ORAL at 05:52

## 2018-03-12 RX ADMIN — Medication SCH: at 09:45

## 2018-03-12 RX ADMIN — SULFAMETHOXAZOLE AND TRIMETHOPRIM SCH TAB: 800; 160 TABLET ORAL at 20:03

## 2018-03-12 NOTE — PDOC.PN
- Subjective


Encounter Start Date: 03/12/18


Encounter Start Time: 12:00


Subjective: no complaints,  at bedside


-: had cath this am





- Objective


Resuscitation Status: 


 











Resuscitation Status           FULL:Full Resuscitation














MAR Reviewed: Yes


Vital Signs & Weight: 


 Vital Signs (12 hours)











  Temp Pulse Resp BP Pulse Ox


 


 03/12/18 08:12  98.2 F  61  20   100


 


 03/12/18 07:48  98.2 F  61  20  148/65 H  100


 


 03/12/18 03:55  98.4 F  64  17  170/74 H  99








 Weight











Weight                         111 lb 5.335 oz














I&O: 


 











 03/11/18 03/12/18 03/13/18





 06:59 06:59 06:59


 


Intake Total  2400 


 


Balance  2400 











Result Diagrams: 


 03/12/18 03:59





 03/12/18 03:59





Phys Exam





- Physical Examination


HEENT: PERRLA, moist MMs


Neck: no JVD, supple


Respiratory: no wheezing, no rales


Cardiovascular: RRR, no significant murmur


Gastrointestinal: soft, non-tender, positive bowel sounds


Musculoskeletal: no edema, pulses present


Neurological: non-focal, moves all 4 limbs


Psychiatric: A&O x 3





Dx/Plan


(1) Ventricular tachycardia


Code(s): I47.2 - VENTRICULAR TACHYCARDIA   Status: Acute   





(2) Syncope


Code(s): R55 - SYNCOPE AND COLLAPSE   Status: Acute   





(3) UTI (urinary tract infection)


Status: Acute   


Qualifiers: 


   Urinary tract infection type: acute cystitis   Hematuria presence: without 

hematuria   Qualified Code(s): N30.00 - Acute cystitis without hematuria   





(4) Dementia


Code(s): F03.90 - UNSPECIFIED DEMENTIA WITHOUT BEHAVIORAL DISTURBANCE   Status: 

Chronic   


Qualifiers: 


   Dementia type: unspecified type   Dementia behavioral disturbance: without 

behavioral disturbance   Qualified Code(s): F03.90 - Unspecified dementia 

without behavioral disturbance   





- Plan


cath showed min cad with no flow limiting disease


-: for AICD likely in am


-: hemostable


-: tele no further vtac


-:  at bedside is aware of all w/u so far and plan





* .








Review of Systems





- Medications/Allergies


Allergies/Adverse Reactions: 


 Allergies











Allergy/AdvReac Type Severity Reaction Status Date / Time


 


gabapentin Allergy  JITTERY Verified 03/10/18 22:34


 


hydrocodone Allergy  MAKES ME Verified 03/10/18 22:34





   CRAZY  


 


procaine HCl [From Novocain] Allergy  NEDTTEVERTON Verified 03/10/18 22:34











Medications: 


 Current Medications





Acetaminophen (Tylenol)  650 mg PO Q4H PRN


   PRN Reason: Headache/Fever or Mild Pain


   Last Admin: 03/12/18 05:49 Dose:  650 mg


Al Hydroxide/Mg Hydroxide (Maalox)  30 ml PO Q6H PRN


   PRN Reason: Heartburn  or Indigestion


Aspirin (Ecotrin)  81 mg PO DAILY [facility]


   Last Admin: 03/12/18 05:52 Dose:  81 mg


Calcium Carbonate (Tums)  1,000 mg PO Q4H PRN


   PRN Reason: Heartburn  or Indigestion


Hydralazine HCl (Apresoline)  5 mg SLOW IVP Q4H PRN


   PRN Reason: SBP Greater Than 180


Sodium Chloride (Normal Saline 0.9%)  1,000 mls @ 100 mls/hr IV .Q10H [facility]


   Last Admin: 03/12/18 05:56 Dose:  Not Given


Labetalol HCl (Normodyne)  10 mg SLOW IVP Q1H PRN


   PRN Reason: Systolic BP > 180


Magnesium Hydroxide (Milk Of Magnesium)  30 ml PO DAILYPRN PRN


   PRN Reason: Constipation


Metoprolol Tartrate (Lopressor)  12.5 mg PO BID [facility]


   Last Admin: 03/12/18 05:48 Dose:  12.5 mg


Nitroglycerin (Nitrostat)  0.4 mg SL Q5MIN PRN


   PRN Reason: Chest Pain


Oxybutynin Chloride (Ditropan)  2.5 mg PO TID [facility]


   Last Admin: 03/12/18 05:48 Dose:  2.5 mg


Polyethylene Glycol (Miralax)  17 gm PO DAILY [facility]


   Last Admin: 03/12/18 09:44 Dose:  17 gm


Pregabalin (Lyrica)  75 mg PO BID [facility]


   Last Admin: 03/12/18 05:48 Dose:  75 mg


Senna (Senokot)  2 tab PO HSPRN PRN


   PRN Reason: Constipation


Sodium Chloride (Flush - Normal Saline)  10 ml IVF Q12HR [facility]


   Last Admin: 03/12/18 09:45 Dose:  Not Given


Sodium Chloride (Flush - Normal Saline)  10 ml IVF PRN PRN


   PRN Reason: Saline Flush


Trimethoprim/Sulfamethoxazole (Bactrim Ds)  1 tab PO BID [facility]


   Last Admin: 03/12/18 09:44 Dose:  1 tab

## 2018-03-13 LAB
ANION GAP SERPL CALC-SCNC: 11 MMOL/L (ref 10–20)
APTT PPP: 30.3 SEC (ref 22.9–36.1)
BASOPHILS # BLD AUTO: 0.1 THOU/UL (ref 0–0.2)
BASOPHILS NFR BLD AUTO: 1.4 % (ref 0–1)
BUN SERPL-MCNC: 14 MG/DL (ref 9.8–20.1)
CALCIUM SERPL-MCNC: 9.4 MG/DL (ref 7.8–10.44)
CHLORIDE SERPL-SCNC: 105 MMOL/L (ref 98–107)
CO2 SERPL-SCNC: 23 MMOL/L (ref 23–31)
CREAT CL PREDICTED SERPL C-G-VRATE: 39 ML/MIN (ref 70–130)
EOSINOPHIL # BLD AUTO: 0 THOU/UL (ref 0–0.7)
EOSINOPHIL NFR BLD AUTO: 1.2 % (ref 0–10)
GLUCOSE SERPL-MCNC: 92 MG/DL (ref 83–110)
HGB BLD-MCNC: 12.8 G/DL (ref 12–16)
INR PPP: 1
LYMPHOCYTES # BLD: 0.8 THOU/UL (ref 1.2–3.4)
LYMPHOCYTES NFR BLD AUTO: 19.8 % (ref 21–51)
MCH RBC QN AUTO: 33.7 PG (ref 27–31)
MCV RBC AUTO: 97.5 FL (ref 81–99)
MONOCYTES # BLD AUTO: 0.3 THOU/UL (ref 0.11–0.59)
MONOCYTES NFR BLD AUTO: 8.1 % (ref 0–10)
NEUTROPHILS # BLD AUTO: 2.9 THOU/UL (ref 1.4–6.5)
NEUTROPHILS NFR BLD AUTO: 69.5 % (ref 42–75)
PLATELET # BLD AUTO: 244 THOU/UL (ref 130–400)
POTASSIUM SERPL-SCNC: 4.2 MMOL/L (ref 3.5–5.1)
PROTHROMBIN TIME: 13.8 SEC (ref 12–14.7)
RBC # BLD AUTO: 3.8 MILL/UL (ref 4.2–5.4)
SODIUM SERPL-SCNC: 135 MMOL/L (ref 136–145)
WBC # BLD AUTO: 4.2 THOU/UL (ref 4.8–10.8)

## 2018-03-13 RX ADMIN — SULFAMETHOXAZOLE AND TRIMETHOPRIM SCH TAB: 800; 160 TABLET ORAL at 09:20

## 2018-03-13 RX ADMIN — SULFAMETHOXAZOLE AND TRIMETHOPRIM SCH TAB: 800; 160 TABLET ORAL at 20:12

## 2018-03-13 RX ADMIN — ASPIRIN SCH MG: 81 TABLET ORAL at 09:21

## 2018-03-13 RX ADMIN — Medication SCH: at 09:22

## 2018-03-13 RX ADMIN — Medication SCH: at 21:43

## 2018-03-13 NOTE — PDOC.PN
- Subjective


Encounter Start Date: 03/13/18


Encounter Start Time: 10:20


Subjective: no c/o palp or passing out or dizziness





- Objective


Resuscitation Status: 


 











Resuscitation Status           FULL:Full Resuscitation














MAR Reviewed: Yes


Vital Signs & Weight: 


 Vital Signs (12 hours)











  Temp Pulse Pulse Resp BP BP BP


 


 03/13/18 13:34    80   116/79  


 


 03/13/18 11:14  98.0 F  67   16    138/67


 


 03/13/18 08:00  98.0 F  67   16   


 


 03/13/18 07:11  98.6 F  82   16    164/77 H


 


 03/13/18 04:33   83     160/82 H 














  Pulse Ox


 


 03/13/18 13:34 


 


 03/13/18 11:14  98


 


 03/13/18 08:00  96


 


 03/13/18 07:11  98


 


 03/13/18 04:33 








 Weight











Weight                         112 lb














I&O: 


 











 03/12/18 03/13/18 03/14/18





 06:59 06:59 06:59


 


Intake Total 2400 2327 960


 


Balance 2400 2327 960











Result Diagrams: 


 03/13/18 12:14





 03/13/18 12:14





Phys Exam





- Physical Examination


HEENT: PERRLA, moist MMs


Neck: no JVD, supple


Respiratory: no wheezing, no rales


Cardiovascular: RRR, no significant murmur


Gastrointestinal: soft, non-tender, positive bowel sounds


Musculoskeletal: no edema, pulses present


Neurological: non-focal, moves all 4 limbs


Psychiatric: normal affect, A&O x 3





Dx/Plan


(1) Ventricular tachycardia


Code(s): I47.2 - VENTRICULAR TACHYCARDIA   Status: Acute   





(2) Syncope


Code(s): R55 - SYNCOPE AND COLLAPSE   Status: Resolved   





(3) UTI (urinary tract infection)


Status: Acute   


Qualifiers: 


   Urinary tract infection type: acute cystitis   Hematuria presence: without 

hematuria   Qualified Code(s): N30.00 - Acute cystitis without hematuria   





(4) Dementia


Code(s): F03.90 - UNSPECIFIED DEMENTIA WITHOUT BEHAVIORAL DISTURBANCE   Status: 

Chronic   


Qualifiers: 


   Dementia type: unspecified type   Dementia behavioral disturbance: without 

behavioral disturbance   Qualified Code(s): F03.90 - Unspecified dementia 

without behavioral disturbance   





- Plan


for EP studies in am for vtac 


-: bactrim for uti


-: low dose lopressor


-: hemostable


-: to amb as tolerated





* .








Review of Systems





- Medications/Allergies


Allergies/Adverse Reactions: 


 Allergies











Allergy/AdvReac Type Severity Reaction Status Date / Time


 


gabapentin Allergy  JITTERY Verified 03/10/18 22:34


 


hydrocodone Allergy  MAKES ME Verified 03/10/18 22:34





   CRAZY  


 


procaine HCl [From Novocain] Allergy  JITTERS Verified 03/10/18 22:34











Medications: 


 Current Medications





Acetaminophen (Tylenol)  1,000 mg PO Q6H PRN


   PRN Reason: Headache/Fever or Pain


Al Hydroxide/Mg Hydroxide (Maalox)  30 ml PO Q6H PRN


   PRN Reason: Heartburn  or Indigestion


Aspirin (Ecotrin)  81 mg PO DAILY [facility]


   Last Admin: 03/13/18 09:21 Dose:  81 mg


Calcium Carbonate (Tums)  1,000 mg PO Q4H PRN


   PRN Reason: Heartburn  or Indigestion


Hydralazine HCl (Apresoline)  5 mg SLOW IVP Q4H PRN


   PRN Reason: SBP Greater Than 180


   Last Admin: 03/13/18 03:41 Dose:  5 mg


Labetalol HCl (Normodyne)  10 mg SLOW IVP Q1H PRN


   PRN Reason: Systolic BP > 180


Magnesium Hydroxide (Milk Of Magnesium)  30 ml PO DAILYPRN PRN


   PRN Reason: Constipation


Metoprolol Tartrate (Lopressor)  12.5 mg PO BID [facility]


   Last Admin: 03/13/18 09:21 Dose:  12.5 mg


Nitroglycerin (Nitrostat)  0.4 mg SL Q5MIN PRN


   PRN Reason: Chest Pain


Oxybutynin Chloride (Ditropan)  2.5 mg PO TID [facility]


   Last Admin: 03/13/18 14:43 Dose:  2.5 mg


Polyethylene Glycol (Miralax)  17 gm PO DAILY [facility]


   Last Admin: 03/13/18 09:20 Dose:  Not Given


Pregabalin (Lyrica)  75 mg PO BID [facility]


   Last Admin: 03/13/18 09:19 Dose:  75 mg


Senna (Senokot)  2 tab PO HSPRN PRN


   PRN Reason: Constipation


Sodium Chloride (Flush - Normal Saline)  10 ml IVF Q12HR [facility]


   Last Admin: 03/13/18 09:22 Dose:  Not Given


Sodium Chloride (Flush - Normal Saline)  10 ml IVF PRN PRN


   PRN Reason: Saline Flush


Trimethoprim/Sulfamethoxazole (Bactrim Ds)  1 tab PO BID [facility]


   Last Admin: 03/13/18 09:20 Dose:  1 tab

## 2018-03-14 VITALS — SYSTOLIC BLOOD PRESSURE: 151 MMHG | DIASTOLIC BLOOD PRESSURE: 72 MMHG

## 2018-03-14 VITALS — TEMPERATURE: 97.7 F

## 2018-03-14 RX ADMIN — Medication SCH ML: at 08:32

## 2018-03-14 RX ADMIN — ASPIRIN SCH: 81 TABLET ORAL at 08:31

## 2018-03-14 RX ADMIN — SULFAMETHOXAZOLE AND TRIMETHOPRIM SCH: 800; 160 TABLET ORAL at 08:32

## 2018-03-14 NOTE — PDOC.PN
- Subjective


Encounter Start Date: 03/14/18


Encounter Start Time: 15:00


Subjective: No complaints. No more syncope. Eager to go home.





- Objective


Resuscitation Status: 


 











Resuscitation Status           FULL:Full Resuscitation














MAR Reviewed: Yes


Vital Signs & Weight: 


 Vital Signs (12 hours)











  Temp Pulse Resp BP Pulse Ox


 


 03/14/18 08:00  97.7 F  68  16  180/86 H  95


 


 03/14/18 03:34  97.4 F L  81  16  164/89 H  97


 


 03/13/18 23:42  97.3 F L  70  16  161/84 H  96








 Weight











Weight                         112 lb 11.2 oz














I&O: 


 











 03/13/18 03/14/18 03/15/18





 06:59 06:59 06:59


 


Intake Total 2327 1960 


 


Balance 2327 1960 











Result Diagrams: 


 03/13/18 12:14





 03/13/18 12:14





Phys Exam





- Physical Examination


Constitutional: NAD


HEENT: moist MMs


mild black eye


Respiratory: no wheezing, no rales, no rhonchi, clear to auscultation bilateral


Cardiovascular: RRR


Gastrointestinal: soft, positive bowel sounds


Musculoskeletal: no edema


Neurological: non-focal, moves all 4 limbs


Psychiatric: normal affect, A&O x 3





Dx/Plan


(1) Nonsustained ventricular tachycardia


Code(s): I47.2 - VENTRICULAR TACHYCARDIA   Status: Resolved   Comment: IRASEMA cifuentes 

says vtach was independent of symptoms and non-sustained, had atrial 

tachycardia during episode, will d/c on Flecanide and f/u EKG in 1 week. Ok'd 

to go home.   





(2) Syncope


Code(s): R55 - SYNCOPE AND COLLAPSE   Status: Resolved   





(3) Coronary artery disease


Code(s): I25.10 - ATHSCL HEART DISEASE OF NATIVE CORONARY ARTERY W/O ANG PCTRS 

  Status: Acute   


Qualifiers: 


   Coronary Disease-Associated Artery/Lesion type: native artery 


Comment: moderate, medical management   





(4) Dementia


Code(s): F03.90 - UNSPECIFIED DEMENTIA WITHOUT BEHAVIORAL DISTURBANCE   Status: 

Chronic   


Qualifiers: 


   Dementia type: unspecified type   Dementia behavioral disturbance: without 

behavioral disturbance   Qualified Code(s): F03.90 - Unspecified dementia 

without behavioral disturbance   





(5) UTI (urinary tract infection)


Status: Acute   


Qualifiers: 


   Urinary tract infection type: acute cystitis   Hematuria presence: without 

hematuria   Qualified Code(s): N30.00 - Acute cystitis without hematuria   


Comment: E.coli, sensitive to Bactrim   





- Plan


cont current plan of care, continue antibiotics, PT/OT, DVT proph w/SCDs


D/c home on Flecanide. Will stop Metoprolol to minimize bradycardia risk.


-: Restart her previous antihypertensive Triam/HCTZ. Ok to D/C. Suspect some


-: aspect of white coat HTN as BP remains normal at home even though alwasy 


-: very elevated in hospital.





* .








- Discharge Day


Encounter end time: 15:20

## 2018-03-14 NOTE — CON
DATE OF CONSULTATION:  03/14/2018

 

ELECTROPHYSIOLOGY CONSULTATION

 

REFERRING PHYSICIAN:  Dr. Adrian Velazquez.

 

REASON FOR CONSULTATION:  Syncope and ventricular tachycardia.

 

HISTORY OF PRESENT ILLNESS:  Ms. Hong is a pleasant 88-year-old woman, well known to [facility].  She presented to the emergency room with syncope.  She does have a significant history of
 syncope in the past, and as a result had an EP study with Dr. Artis in January.  There was no inducib
le SVT or ventricular tachycardia at that time.  She was noted to have a dual AV node physiology with
out inducible tachyarrhythmias.  She has a longstanding left bundle branch block.  However, her EF in
 01/2018 was documented at 45%-50% and she has no functional symptoms of the heart failure.  She had 
a loop recorder implanted in 01/2018.  Interrogation of this device revealed 2 episodes of rapid rhyt
hms; first one occurring on 02/22/2018 and the second one on 03/10/2018.  There was 1 prolonged rapid
 arrhythmia that was an SVT by QRS morphology.  Alternatively, the second episode, which was 6-7 seco
nds in duration, was truly a nonsustained ventricular tachycardia.  Other than these two episodes, no
 additional tachyarrhythmias were detected.

 

Currently, she denies heart racing, palpitations, chest pain or pressure, stroke or stroke-like sympt
oms.  She does endorse a history of 3 prior syncopal episodes.

 

REVIEW OF SYSTEMS:  A 12-point review of systems was conducted and is negative except that listed in 
the HPI.

 

PAST MEDICAL HISTORY:

1.  Recurrent syncope.

2.  Left bundle branch block.

3.  History of hypertension.

4.  History of urine incontinence and dementia.

5.  Lower back pain with prior surgery.

6.  Vitamin B12 deficiency.

7.  LINQ implantable loop recorder, placed 01/08/2018, following EP study.

 

PAST SURGICAL HISTORY:  Laminectomy, hysterectomy, and a bladder lift.

 

ALLERGIES:  Include NORCO, PROCAINE, and GABAPENTIN.

 

CURRENT MEDICATIONS:  Vitamin D3, donepezil, Premarin, oxybutynin, Lyrica, tizanidine, and Thera-M Pl
us.

 

PHYSICAL EXAMINATION:

MOST RECENT VITAL SIGNS:  97.7 degrees Fahrenheit, pulse is 68, respirations 16, 95% oxygen saturatio
n on room air, blood pressure 175/84.

GENERAL:  Ms. Hong is a pleasant elderly female in no acute distress.  She is well groomed and we
ll nourished.  She is alert and oriented.  Her speech is clear and her affect is appropriate.  She is
 somewhat forgetful and at times poor historian with her dementia.

HEENT:  Patient has a laceration on her right temporal region with sutures.  There is some bruising a
long her forehead and down underneath her right eye.  Sclerae are anicteric.  EOMs intact.  Oral muco
sa is moist and pink with adequate dentition.

NECK:  Supple, without jugular venous distention.  Her carotids are without bruits with brisk uptake 
bilaterally.  There is no lymphadenopathy and her thyroid is nonpalpable.

LUNGS:  Clear to auscultation without wheezes, crackles, or rhonchi.  Respirations are even and unlab
ored.

HEART:  Her heart rate is irregularly irregular with a normal S1 and S2.  There is no significant mur
mur, rub, or gallop appreciated.  Her PMI is nondisplaced.

EXTREMITIES:  Warm and dry to touch without clubbing, cyanosis, or edema.

ABDOMEN:  Soft and nontender, without palpable masses.  Positive bowel tones are noted throughout.  H
epatojugular reflux is negative.

NEUROLOGIC EXAM:  Grossly intact and exam is nonfocal.  Her gait was not assessed as the patient was 
resting throughout exam.

 

DATABASE:  Recent lab results, 03/13/2018, WBC 4.2, hemoglobin 12.8, hematocrit 37.0, platelet count 
244.  Chemistry:  Sodium 135, potassium 4.2, chloride 105, carbon dioxide 23, BUN 14, creatinine 0.81
, magnesium 2.1, phosphorus 3.8.  Urinalysis on 03/10/2018 showed 21-50 wbcs with 3+ bacteria, for wh
ich she received ceftriaxone and Bactrim.  Cardiac catheterization on 03/10/2018 revealed mild-to-mod
erate CAD, 30% stenosis on the first diagonal.

 

RECOMMENDATION:  Medical management.

 

REVIEW OF LINQ INTERROGATION:  As detailed above, the patient did have 2 episodes of tachyarrhythmias
, one of which was an SVT and was most pronounced and longest lasting episode.  This was not associat
ed with a syncopal episode.  On 03/10/2018, she did have an episode of nonsustained ventricular tachy
cardia, seconds in duration, which did occur around the time of her syncopal episode, but is unlikely
 to have caused her syncope.

 

IMPRESSION:

1.  Recurrent syncopal spells.

2.  Slightly reduced left ventricular ejection fraction of 45%-50%.

3.  Left bundle branch block.

4.  Negative EP study in 01/2018 with no definite need for pacemaker or ablation, dual AV node physio
logy present without inducible tachyarrhythmias.

 

ASSESSMENT AND PLAN:

1.  Nonsustained ventricular tachycardia, one brief episode captured on implantable loop recorder.

2.  Supraventricular tachycardia, a fairly prolonged episode captured on implantable loop recorder.

 

RECOMMENDATIONS:  At this point, the recommended course of action would be medical management.  She i
s currently on metoprolol 25 mg b.i.d. and we will be adding flecainide 50 mg b.i.d. for arrhythmia s
uppression.  At this time, an ICD is not indicated given the patient only had a very brief episode of
 nonsustained ventricular tachycardia, which is unlikely to have caused her syncopal episode.  Linda mobley was recently taken off her amlodipine as medically-induced hypotension was a consideration for caus
e for her syncopal episodes.  We will continue to monitor her as an outpatient.  She will return to [facility] in 1 week for EKG, having just initiated flecainide.  Patient was instructed to contact the off
ice if she has any gait instability while on flecainide.  We will continue to monitor with her [employer] l
oop recorder for correlation of symptoms with any future syncopal episode should it recur.  We also d
iscussed pacemaker implantation or AV node ablation in the future if unable to medically manage rapid
 rates and prevent syncopal episodes.  Dr. Camacho interviewed and examined the patient, interpreted th
e loop recorder and developed the plan of care.

## 2018-03-15 NOTE — DIS
DATE OF ADMISSION:  03/10/2018

 

DATE OF DISCHARGE:  03/14/2018

 

PRIMARY CARE PHYSICIAN:  Dr. Sanchez.

 

ADMISSION DIAGNOSIS:  Nonsustained symptomatic ventricular tachycardia.

 

DISCHARGE DIAGNOSES:

1.  Symptomatic atrial tachycardia with syncope.

2.  Nonsustained ventricular tachycardia, asymptomatic.

3.  Mild-to-moderate coronary artery disease.

4.  Dementia.

5. Acute cystitis with Escherichia coli.

 

PROCEDURES:

1.  Coronary catheterization showing mild-to-moderate coronary artery disease with recommended medica
l management.

2.  CT of the brain showing no evidence for fracture or intracranial hemorrhage.

3.  Cervical spine CT showing no evidence for fracture or subluxation.

4.  EP study results pending.

 

CONSULTATIONS:

1.  Cardiology, Dr. Velazquez.

2.  Pulmonology, Dr. Hunter.

3.  Electrophysiology.

 

SUMMARY OF HOSPITAL COURSE:  This is an 88-year-old white female previously admitted for syncope with
 possible symptomatic bradycardia who has had a LINQ recorder placement earlier in the year.  She had
 a syncopal episode while she was sitting in her car.  The [employer] interrogation showed some ventri
cular tachycardia, so she was put in the hospital.  She also noted to have a urinary tract infection 
on her urinalysis.  CT of the brain and neck were normal as above.  The patient had a Cardiology cons
ult with Dr. Velazquez who did a cardiac catheterization, it showed moderate disease, medical managem
ent only.  She was started on aspirin.  She then had Electrophysiology consult and evaluation.  Elect
rophysiologist did determine that the nonsustained ventricular tachycardia on her monitor was actuall
y not at the time of her syncopal episode.  At the time of syncopal episode, she actually had a very 
short burst of atrial tachycardia into the 150s.  He determined that she did not need an ICD placemen
t or pacemaker, recommended medical management with flecainide.  She is stable for discharge.

 

DISCHARGE MANAGEMENT:  Discharged home.

 

FOLLOWUP:  Follow up in Dr. Velazquez's office for the EKG in 1 week.  We will call her to make an ap
pointment and follow up with Dr. Sanchez in 2-3 weeks.

 

ACTIVITY:  As tolerated.

 

DIET:  Healthy-heart, low sodium diet.

 

MEDICATIONS:

1.  Aspirin 81 mg daily, 30 tablets dispensed.

2.  Flecainide 50 mg twice a day, 60 tablets dispensed.

3.  Bactrim Double Strength 1 tablet twice a day for another 8 days, 16 tablets dispensed.

4.  Triamterene/hydrochlorothiazide 37.5/25 mg 1 cap daily, 30 caps dispensed and resume other home m
edications.

5.  Acetaminophen 2 tablets every 4 hours as needed.

6.  Vitamin D3 one tablet daily.

7.  Donepezil 10 mg each morning.

8.  Multivitamin daily.

9.  Oxybutynin chloride 2.5 mg 3 times a day.

10.  MiraLax 17 grams daily.

11.  Lyrica 75 mg twice a day.

## 2018-03-20 ENCOUNTER — HOSPITAL ENCOUNTER (EMERGENCY)
Dept: HOSPITAL 92 - SCSER | Age: 83
Discharge: HOME | End: 2018-03-20
Payer: MEDICARE

## 2018-03-20 DIAGNOSIS — M43.10: ICD-10-CM

## 2018-03-20 DIAGNOSIS — S01.81XD: Primary | ICD-10-CM

## 2018-03-20 DIAGNOSIS — I10: ICD-10-CM

## 2022-02-28 NOTE — CON
Patient is an 88-year-old women who presented with acute onset of syncope. 
Patient has no previous cardiac history. She recently had back surgery. Patient 
was in [facility] when she suddenly lost consciousness. The patient denied 
having any chest pain or dyspnea. She did not lose control of her bladder or 
bowel. The patient was noted to have had apparently a slow heart rate. The 
patient was sent to [facility] for further evaluation. The 
patient has had no previous episodes of syncope or lightheadedness.

 

PAST MEDICAL HISTORY:



1. Hypertension.

2. Incontinence.

3. Spinal disease.

4. Dementia. 

 

PAST SURGICAL HISTORY:

Bladder surgery and spinal surgery. 

 

SOCIAL  HISTORY:

She is a nonsmoker. 

 

ALLERGIES:

GABAPENTIN, HYDROCODONE, AND PROCAINE. 

 

MEDICATIONS ON ADMISSION INCLUDE:

Acetaminophen 500 p.r.n., Norvasc 5 b.i.d., donepezil 10 q. day, estrogen 0.625 
q. day, Oxybutynin 2.5 t.i.d., Lyrica 75 mg b.i.d., Tramodol 50 mg q. h.s.

 

REVIEW OF SYSTEMS:

Ten point system noticable for incontinence otherwise unremarkable. 

 

PHYSICAL EXAMINATION:

GENERAL: Elderly women in no acute distress.

VITAL SIGNS: BP-175/91,  heart rate 99.

NECK: No jugular venous distention. 

LUNGS : Clear to auscultation. 

HEART: Regular rate and rhythm. Normal S1 and S2. 

ABDOMEN: Nondistended. 

EXTREMITIES: No edema. 

SKIN: Warm and dry. 

NEUROLOGICAL EXAM: Nonfocal.

VASCULAR: Radial pulses  are 2+.

 

LABORATORY RESULTS:

Sodium 133, potassium 4.1, chloride 104, bicarb 123, BUN 11, creatinine 0.64, 
troponin less than 0.01. White blood cell count is 9.2, hemoglobin 10.2, 
hematocrit 29.8. Platelets are 324.

 

EKG revealed normal sinus rhythm. Left bundle branch block.

 

IMPRESSION:  

1. Syncope. 

2. Hypertension.

3. Incontinence.

4. Dementia. 

 

This patient presents with a syncopal episode. She has a left bundle branch 
block and apparently developed  bradycardia. There is no clear documentation of 
complete heart block. We will ask EP to evaluate the patient to assess whether 
she should had placement of a Linq device versus EPS study to evaluate her 
conduction system. We will recheck an echocardiogram and will follow this  
patient with you through her hospitalization.

 

NOAM PAIN SCALE 9 OF 10.